# Patient Record
Sex: MALE | Race: WHITE | NOT HISPANIC OR LATINO | Employment: FULL TIME | URBAN - METROPOLITAN AREA
[De-identification: names, ages, dates, MRNs, and addresses within clinical notes are randomized per-mention and may not be internally consistent; named-entity substitution may affect disease eponyms.]

---

## 2017-10-04 ENCOUNTER — ALLSCRIPTS OFFICE VISIT (OUTPATIENT)
Dept: OTHER | Facility: OTHER | Age: 27
End: 2017-10-04

## 2017-10-04 ENCOUNTER — GENERIC CONVERSION - ENCOUNTER (OUTPATIENT)
Dept: OTHER | Facility: OTHER | Age: 27
End: 2017-10-04

## 2017-10-26 ENCOUNTER — OFFICE VISIT (OUTPATIENT)
Dept: URGENT CARE | Facility: MEDICAL CENTER | Age: 27
End: 2017-10-26
Payer: COMMERCIAL

## 2017-10-26 PROCEDURE — G0382 LEV 3 HOSP TYPE B ED VISIT: HCPCS

## 2017-10-28 NOTE — PROGRESS NOTES
Assessment  1  Sore throat (462) (J02 9)    Plan  Sore throat    · Rapid StrepA- POC; Source:Throat; Status:Complete;   Done: 56SHJ6689 08:00PM    negative strep  ears are clear  may be viral  just finished antibiotics which would cover for strep  tylenol for sore throat  continue otc decongestants  if worse or fever f/u here or PCP  Chief Complaint  1  Ear Pain  Chief Complaint Free Text Note Form: PT  Presents with B/L ear pain R greater than L  History of Present Illness  HPI: 31 y/o M c/o BL ear and sore throat for 2 days  He says he just finished 1 days of cefidinr a few days ago  His girlfriend had strep  No fever  No cough some runny nose  taking otc congestion meds  Hospital Based Practices Required Assessment:   Pain Assessment   the patient states they have pain  The pain is located in the ears  (on a scale of 0 to 10, the patient rates the pain at 8 )       Active Problems  1  Asthma, mild intermittent (493 90) (J45 20)   2  Classic migraine with aura (346 00) (G43 109)   3  Left otitis media (382 9) (H66 92)   4  Limb pain (729 5) (M79 609)   5  Male erectile dysfunction (607 84) (N52 9)   6  Orchitis (604 90) (N45 2)   7  Rhinitis (472 0) (J31 0)   8  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)   9  Testicular hypogonadism (257 2) (E29 1)    Past Medical History  1  History of Denial Of Any Significant Medical History   2  History of acne (V13 3) (Z87 2)   3  History of acute sinusitis (V12 69) (Z87 09)   4  History of Intertrigo (695 89) (L30 4)    Family History  Mother    1  Family history of Family Health Status Of Mother - Alive   2  Family history of Hypertension (V17 49)   3  Family history of Osteoporosis (V17 81)  Father    4  Family history of Family Health Status Of Father - Alive   5   Family history of malignant neoplasm of prostate (S51 40) (Z80 45)    Social History   · History of Active smoker (305 1) (Z72 0)   · Being A Social Drinker   · Caffeine Use   · Single    Surgical History  1  History of Arthroscopy Knee    Current Meds   1  Claritin-D 24 Hour  MG Oral Tablet Extended Release 24 Hour; Therapy: (Recorded:04Oct2017) to Recorded   2  Fluticasone Propionate 50 MCG/ACT Nasal Suspension; USE 2 SPRAYS IN EACH   NOSTRIL ONCE DAILY; Therapy: 27GTP2719 to (Last Rx:[id number])  Requested for: 08Apr2016 Ordered   3  ProAir  (90 Base) MCG/ACT Inhalation Aerosol Solution; INHALE 1 TO 2 PUFFS   EVERY 4 TO 6 HOURS AS NEEDED; Therapy: 96HHE2334 to (Last Rx:[id number])  Requested for: 08Apr2016 Ordered    Allergies  1  Aspirin TABS   2  Motrin TABS   3  Penicillins    Vitals  Signs   Recorded: 30XDP3163 06:40PM   Temperature: 98 2 F, Temporal  Heart Rate: 104  Respiration: 18  Systolic: 376  Diastolic: 52  Weight: 204 lb   BMI Calculated: 35 62  BSA Calculated: 2 37  O2 Saturation: 99  Pain Scale: 8    Physical Exam    Constitutional   General appearance: No acute distress, well appearing and well nourished  Eyes   Conjunctiva and lids: No swelling, erythema, or discharge  Pupils and irises: Equal, round and reactive to light  Ears, Nose, Mouth, and Throat   External inspection of ears and nose: Normal     Otoscopic examination: Tympanic membrance translucent with normal light reflex  Canals patent without erythema  Nasal mucosa, septum, and turbinates: Normal without edema or erythema  Oropharynx: Abnormal  -- mild erythema  no exudates or swelling  Pulmonary   Respiratory effort: No increased work of breathing or signs of respiratory distress  Auscultation of lungs: Clear to auscultation  Cardiovascular   Auscultation of heart: Normal rate and rhythm, normal S1 and S2, without murmurs  Abdomen   Abdomen: Non-tender, no masses  Lymphatic   Palpation of lymph nodes in neck: Abnormal  -- R ant cerv LAD        Signatures   Electronically signed by : [facility]; Oct 26 2017  8:00PM EST                       (Author)    Electronically signed by : FIORELLA Veloz ; Oct 27 2017  1:44PM EST                       (Co-author)

## 2018-01-13 NOTE — PROGRESS NOTES
Assessment    1  History of Intertrigo (695 89) (L30 4)   2  History of Active smoker (305 1) (Z72 0)   3  Encounter for preventive health examination (V70 0) (Z00 00)   4  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) (N50 8)    Plan  Asthma, mild intermittent    · ProAir  (90 Base) MCG/ACT Inhalation Aerosol Solution; INHALE 1  TO 2 PUFFS EVERY 4 TO 6 HOURS AS NEEDED  Health Maintenance    · (1) BASIC METABOLIC PROFILE; Status:Active; Requested for:08Apr2016;    · (1) LIPID PANEL, FASTING; Status:Active; Requested for:08Apr2016;   Rhinitis    · Fluticasone Propionate 50 MCG/ACT Nasal Suspension; USE 2 SPRAYS IN  EACH NOSTRIL ONCE DAILY  Testicular hypogonadism    · US SCROTUM AND TESTICLES; Status:Hold For - Scheduling; Requested  ONV:[id number]; Unlinked    · [facility] Allergy CAPS    Discussion/Summary  Impression: health maintenance visit  Currently, he eats an adequate diet and has an adequate exercise regimen  Prostate cancer screening: PSA is not indicated  Testicular cancer screening: the risks and benefits of testicular cancer screening were discussed  Colorectal cancer screening: colorectal cancer screening is not indicated  Screening lab work includes glucose and lipid profile  Advice and education were given regarding nutrition, aerobic exercise, weight bearing exercise and weight loss  History of Present Illness  , Adult Male: The patient is being seen for a health maintenance evaluation  General Health: The patient's health since the last visit is described as good  He has regular dental visits  He denies vision problems  He denies hearing loss  Immunizations status: up to date  Lifestyle:  He consumes a diverse and healthy diet  He does not have any weight concerns  He exercises regularly  He does not use tobacco  He denies alcohol use  He denies drug use  Reproductive health:  the patient is sexually active  birth control is being practiced  He denies erectile dysfunction     Screening: cancer screening reviewed and current  metabolic screening reviewed and current  risk screening reviewed and current  Review of Systems    Constitutional: No fever or chills, feels well, no tiredness, no recent weight gain or weight loss  Eyes: No complaints of eye pain, no red eyes, no discharge from eyes, no itchy eyes  ENT: Has mild TMJ, but no complaints of earache, no hearing loss, no nosebleeds, no nasal discharge, no sore throat, no hoarseness  Cardiovascular: No complaints of slow heart rate, no fast heart rate, no chest pain, no palpitations, no leg claudication, no lower extremity  Respiratory: No complaints of shortness of breath, no wheezing, no cough, no SOB on exertion, no orthopnea or PND  Gastrointestinal: No complaints of abdominal pain, no constipation, no nausea or vomiting, no diarrhea or bloody stools  Genitourinary: No complaints of dysuria, no incontinence, no hesitancy, no nocturia, no genital lesion, no testicular pain  Musculoskeletal: No complaints of arthralgia, no myalgias, no joint swelling or stiffness, no limb pain or swelling  Integumentary: No complaints of skin rash or skin lesions, no itching, no skin wound, no dry skin  Neurological: Has 1-2 ophthalmic migraines a months, but No compliants of headache, no confusion, no convulsions, no numbness or tingling, no dizziness or fainting, no limb weakness, no difficulty walking  Psychiatric: Is not suicidal, no sleep disturbances, no anxiety or depression, no change in personality, no emotional problems  Endocrine: No complaints of proptosis, no hot flashes, no muscle weakness, no erectile dysfunction, no deepening of the voice, no feelings of weakness  Hematologic/Lymphatic: No complaints of swollen glands, no swollen glands in the neck, does not bleed easily, no easy bruising  Active Problems    1  Asthma, mild intermittent (493 90) (J45 20)   2  Classic migraine with aura (346 00) (G43 109)   3  Limb pain (729 5) (M79 609)   4  Male erectile dysfunction (607 84) (N52 9)   5  Orchitis (604 90) (N45 2)   6  Rhinitis (472 0) (J31 0)   7  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) (N50 8)   8  Testicular hypogonadism (257 2) (E29 1)    Past Medical History    · History of Denial Of Any Significant Medical History   · History of acne (V13 3) (Z87 2)   · History of acute sinusitis (V12 69) (Z87 09)   · History of Intertrigo (695 89) (L30 4)    The past medical history was reviewed and updated today  Surgical History    · History of Arthroscopy Knee    The surgical history was reviewed and updated today  Family History    · Family history of Family Health Status Of Mother - Alive   · Family history of Hypertension (V17 49)   · Family history of Osteoporosis (V17 81)    · Family history of Family Health Status Of Father - Alive   · Family history of malignant neoplasm of prostate (V16 42) (Z80 42)    The family history was reviewed and updated today  Social History    · History of Active smoker (305 1) (Z72 0)   · Being A Social Drinker   · Caffeine Use   · Single  The social history was reviewed and updated today  The social history was reviewed and is unchanged  Current Meds   1  ProAir  (90 Base) MCG/ACT Inhalation Aerosol Solution; INHALE 1 TO 2 PUFFS   EVERY 4 TO 6 HOURS AS NEEDED; Therapy: 30BJF3801 to (Last Rx:[id number])  Requested for: 69UDK6518 Ordered   2  [facility] Allergy CAPS; Therapy: ([phone]) to Recorded    The medication list was reviewed and updated today  Allergies    1  Aspirin TABS   2  Motrin TABS   3  Penicillins    Vitals   Recorded: 08Apr2016 08:32AM   Heart Rate 76   Respiration 16   Systolic 686   Diastolic 60   Height 5 ft 11 5 in   Weight 255 lb 6 08 oz   BMI Calculated 35 12   BSA Calculated 2 35     Physical Exam    Constitutional   General appearance: No acute distress, well appearing and well nourished      Eyes   Conjunctiva and lids: No erythema, swelling or discharge  Pupils and irises: Equal, round, reactive to light  Ophthalmoscopic examination: Normal fundi and optic discs  Ears, Nose, Mouth, and Throat   External inspection of ears and nose: Normal     Otoscopic examination: Tympanic membranes translucent with normal light reflex  Canals patent without erythema  Hearing: Normal     Nasal mucosa, septum, and turbinates: Normal without edema or erythema  Lips, teeth, and gums: Normal, good dentition  Oropharynx: Normal with no erythema, edema, exudate or lesions  Neck   Neck: Supple, symmetric, trachea midline, no masses  Thyroid: Normal, no thyromegaly  Pulmonary   Respiratory effort: No increased work of breathing or signs of respiratory distress  Percussion of chest: Normal     Palpation of chest: Normal     Auscultation of lungs: Clear to auscultation  Cardiovascular   Palpation of heart: Normal PMI, no thrills  Auscultation of heart: Normal rate and rhythm, normal S1 and S2, no murmurs  Carotid pulses: 2+ bilaterally  Abdominal aorta: Normal     Femoral pulses: 2+ bilaterally  Pedal pulses: 2+ bilaterally  Examination of extremities for edema and/or varicosities: Normal     Chest   Breasts: Normal, no dimpling or skin changes appreciated  Palpation of breasts and axillae: Normal, no masses palpated  Chest: Normal     Abdomen   Abdomen: Non-tender, no masses  Liver and spleen: No hepatomegaly or splenomegaly  Examination for hernias: No hernias appreciated  Anus, perineum, and rectum: Normal sphincter tone, no masses, no prolapse  Stool sample for occult blood: Negative  Genitourinary   Scrotal contents: Normal testes, no masses  Penis: Normal, no lesions  Lymphatic   Palpation of lymph nodes in neck: No lymphadenopathy  Palpation of lymph nodes in axillae: No lymphadenopathy  Palpation of lymph nodes in groin: No lymphadenopathy      Palpation of lymph nodes in other areas: No lymphadenopathy  Musculoskeletal   Gait and station: Normal     Inspection/palpation of digits and nails: Normal without clubbing or cyanosis  Inspection/palpation of joints, bones, and muscles: Normal     Range of motion: Normal     Stability: Normal     Muscle strength/tone: Normal     Skin   Skin and subcutaneous tissue: Normal without rashes or lesions  Palpation of skin and subcutaneous tissue: Normal turgor  Neurologic   Cranial nerves: Cranial nerves 2-12 intact  Reflexes: 2+ and symmetric  Sensation: No sensory loss  Psychiatric   Judgment and insight: Normal     Orientation to person, place and time: Normal     Recent and remote memory: Intact      Mood and affect: Normal        Signatures   Electronically signed by : LOU Ordonez ; Apr 8 2016  9:15AM EST                       (Author)

## 2018-01-16 NOTE — RESULT NOTES
Verified Results  (1) LIPID PANEL, FASTING 08Apr2016 09:32AM Junito Ibrahim   TW Order Number: [id number]      Triglyceride:         Normal              <150 mg/dl       Borderline High    150-199 mg/dl       High               200-499 mg/dl       Very High          >499 mg/dl  Cholesterol:         Desirable        <200 mg/dl      Borderline High  200-239 mg/dl      High             >239 mg/dl  HDL Cholesterol:        High    >59 mg/dL      Low     <41 mg/dL     Test Name Result Flag Reference   CHOLESTEROL 163 mg/dL     HDL,DIRECT 45 mg/dL  40-60   LDL CHOLESTEROL CALCULATED 92 mg/dL  0-100   TRIGLYCERIDES 132 mg/dL  <=150   Specimen collection should occur prior to N-Acetylcysteine or Metamizole administration due to the potential for falsely depressed results  (1) BASIC METABOLIC PROFILE 06TIH1227 09:32AM Junito Ibrahim    Order Number: [id number]      National Kidney Disease Education Program recommendations are as follows:  GFR calculation is accurate only with a steady state creatinine  Chronic Kidney disease less than 60 ml/min/1 73 sq  meters  Kidney failure less than 15 ml/min/1 73 sq  meters  Test Name Result Flag Reference   GLUCOSE,RANDM 87 mg/dL     If the patient is fasting, the ADA then defines impaired fasting glucose as > 100 mg/dL and diabetes as > or equal to 123 mg/dL     SODIUM 139 mmol/L  136-145   POTASSIUM 4 3 mmol/L  3 5-5 3   CHLORIDE 105 mmol/L  100-108   CARBON DIOXIDE 28 mmol/L  21-32   ANION GAP (CALC) 6 mmol/L  4-13   BLOOD UREA NITROGEN 12 mg/dL  5-25   CREATININE 0 98 mg/dL  0 60-1 30   Standardized to IDMS reference method   CALCIUM 8 9 mg/dL  8 3-10 1   eGFR Non-African American      >60 0 ml/min/1 73sq m

## 2018-01-22 VITALS
SYSTOLIC BLOOD PRESSURE: 146 MMHG | HEART RATE: 120 BPM | WEIGHT: 262.25 LBS | HEIGHT: 72 IN | TEMPERATURE: 101.2 F | DIASTOLIC BLOOD PRESSURE: 60 MMHG | BODY MASS INDEX: 35.52 KG/M2 | RESPIRATION RATE: 18 BRPM

## 2018-01-23 NOTE — MISCELLANEOUS
Message  Return to work or school:   Maru Fischer is under my professional care   He was seen in my office on 10/4/2017   He is able to return to work on  10/5/2017            Signatures   Electronically signed by : LOU Kimble ; Dec 26 2017  4:19PM EST                       (Author)

## 2019-10-13 ENCOUNTER — OFFICE VISIT (OUTPATIENT)
Dept: URGENT CARE | Facility: CLINIC | Age: 29
End: 2019-10-13
Payer: COMMERCIAL

## 2019-10-13 VITALS
WEIGHT: 270 LBS | DIASTOLIC BLOOD PRESSURE: 68 MMHG | RESPIRATION RATE: 18 BRPM | HEART RATE: 84 BPM | HEIGHT: 71 IN | SYSTOLIC BLOOD PRESSURE: 150 MMHG | OXYGEN SATURATION: 98 % | TEMPERATURE: 97 F | BODY MASS INDEX: 37.8 KG/M2

## 2019-10-13 DIAGNOSIS — R19.7 DIARRHEA, UNSPECIFIED TYPE: ICD-10-CM

## 2019-10-13 DIAGNOSIS — J01.90 ACUTE SINUSITIS, RECURRENCE NOT SPECIFIED, UNSPECIFIED LOCATION: Primary | ICD-10-CM

## 2019-10-13 PROCEDURE — G0383 LEV 4 HOSP TYPE B ED VISIT: HCPCS | Performed by: PHYSICIAN ASSISTANT

## 2019-10-13 RX ORDER — FLUTICASONE PROPIONATE 50 MCG
1 SPRAY, SUSPENSION (ML) NASAL DAILY
Qty: 1 BOTTLE | Refills: 0 | Status: SHIPPED | OUTPATIENT
Start: 2019-10-13

## 2019-10-13 RX ORDER — LORATADINE AND PSEUDOEPHEDRINE 10; 240 MG/1; MG/1
TABLET, EXTENDED RELEASE ORAL
COMMUNITY

## 2019-10-13 RX ORDER — AZITHROMYCIN 250 MG/1
TABLET, FILM COATED ORAL
Qty: 6 TABLET | Refills: 0 | Status: SHIPPED | OUTPATIENT
Start: 2019-10-13 | End: 2019-10-17

## 2019-10-13 NOTE — PROGRESS NOTES
[facility]        NAME: Paola Metcalf is a 34 y o  male  : 1990    MRN: [id number]  DATE: 2019  TIME: 2:14 PM    Assessment and Plan   Acute sinusitis, recurrence not specified, unspecified location [J01 90]  1  Acute sinusitis, recurrence not specified, unspecified location  azithromycin (ZITHROMAX) 250 mg tablet    fluticasone (FLONASE) 50 mcg/act nasal spray   2  Diarrhea, unspecified type           Patient Instructions     1  Sinusitis  -take antibiotic as directed  -Flonase nasal spray  -Increase fluids  -Tylenol/motrin  -Salt H20 gargles/throat lozenges  -Try using humidifier at nighttime    2  Diarrhea- benign abdominal exam  -Take OTC immodium as directed  -BRAT diet  -Increase fluids    -Follow-up with PCP within 5 days  -Go to ER with worsening symptoms, difficulty breathing, high fever, or any signs of distress    Chief Complaint     Chief Complaint   Patient presents with    Cold Like Symptoms     sinus pain/congestion x 8 days  reports bloody noses recently   Diarrhea     and abd cramps that started yesterday   Earache     L ear pain/fullness         History of Present Illness       The patient presents today for an evaluation of cold symptoms and sinus pain/pressure for the past 8 days  The patient states that he is having thick mucous from his nose  He also had a bloody nose this morning  The patient also states that he started with diarrhea yesterday as well  Review of Systems   Review of Systems   Constitutional: Negative for chills and fever  HENT: Positive for congestion, sinus pressure and sinus pain  Negative for ear pain and sore throat  Respiratory: Negative for shortness of breath  Cardiovascular: Negative for chest pain  Gastrointestinal: Positive for diarrhea  Negative for abdominal pain  Musculoskeletal: Negative for arthralgias  Skin: Negative for rash  Neurological: Negative for headaches     All other systems reviewed and are negative  Current Medications       Current Outpatient Medications:     loratadine-pseudoephedrine (CLARITIN-D 24 HOUR)  mg per 24 hr tablet, Take by mouth, Disp: , Rfl:     azithromycin (ZITHROMAX) 250 mg tablet, Take 2 tablets today then 1 tablet daily x 4 days, Disp: 6 tablet, Rfl: 0    fluticasone (FLONASE) 50 mcg/act nasal spray, 1 spray into each nostril daily, Disp: 1 Bottle, Rfl: 0    Current Allergies     Allergies as of 10/13/2019 - Reviewed 10/13/2019   Allergen Reaction Noted    Aspirin  11/13/2013    Ibuprofen  11/05/2007    Penicillins  11/05/2007            The following portions of the patient's history were reviewed and updated as appropriate: allergies, current medications, past family history, past medical history, past social history, past surgical history and problem list      History reviewed  No pertinent past medical history  History reviewed  No pertinent surgical history  History reviewed  No pertinent family history  Medications have been verified  Objective   /68 (BP Location: Left arm, Patient Position: Sitting)   Pulse 84   Temp (!) 97 °F (36 1 °C) (Tympanic)   Resp 18   Ht 5' 11" (1 803 m)   Wt 122 kg (270 lb)   SpO2 98%   BMI 37 66 kg/m²        Physical Exam     Physical Exam   Constitutional: He is oriented to person, place, and time  He appears well-developed and well-nourished  No distress  HENT:   Head: Normocephalic and atraumatic  Right Ear: Hearing, tympanic membrane, external ear and ear canal normal    Left Ear: Hearing, tympanic membrane, external ear and ear canal normal    Nose: Mucosal edema present  Right sinus exhibits maxillary sinus tenderness  Mouth/Throat: Uvula is midline, oropharynx is clear and moist and mucous membranes are normal    Eyes: Pupils are equal, round, and reactive to light  Conjunctivae and EOM are normal    Neck: Normal range of motion  Neck supple     Cardiovascular: Normal rate, regular rhythm and normal heart sounds  Pulmonary/Chest: Effort normal and breath sounds normal    Abdominal: Soft  Bowel sounds are normal  There is no tenderness  Lymphadenopathy:     He has no cervical adenopathy  Neurological: He is alert and oriented to person, place, and time  Skin: Skin is warm and dry  Psychiatric: He has a normal mood and affect  Nursing note and vitals reviewed

## 2019-10-13 NOTE — PATIENT INSTRUCTIONS
1  Sinusitis  -take antibiotic as directed  -Flonase nasal spray  -Increase fluids  -Tylenol/motrin  -Salt H20 gargles/throat lozenges  -Try using humidifier at nighttime    2   Diarrhea- benign abdominal exam  -Take OTC immodium as directed  -BRAT diet  -Increase fluids    -Follow-up with PCP within 5 days  -Go to ER with worsening symptoms, difficulty breathing, high fever, or any signs of distress

## 2019-12-08 ENCOUNTER — APPOINTMENT (OUTPATIENT)
Dept: RADIOLOGY | Facility: CLINIC | Age: 29
End: 2019-12-08
Payer: COMMERCIAL

## 2019-12-08 ENCOUNTER — OFFICE VISIT (OUTPATIENT)
Dept: URGENT CARE | Facility: CLINIC | Age: 29
End: 2019-12-08
Payer: COMMERCIAL

## 2019-12-08 VITALS
BODY MASS INDEX: 38.05 KG/M2 | HEART RATE: 119 BPM | OXYGEN SATURATION: 97 % | SYSTOLIC BLOOD PRESSURE: 148 MMHG | RESPIRATION RATE: 18 BRPM | TEMPERATURE: 99.3 F | DIASTOLIC BLOOD PRESSURE: 65 MMHG | WEIGHT: 272.8 LBS

## 2019-12-08 DIAGNOSIS — M79.642 LEFT HAND PAIN: ICD-10-CM

## 2019-12-08 DIAGNOSIS — M79.642 LEFT HAND PAIN: Primary | ICD-10-CM

## 2019-12-08 PROCEDURE — G0382 LEV 3 HOSP TYPE B ED VISIT: HCPCS | Performed by: PHYSICIAN ASSISTANT

## 2019-12-08 PROCEDURE — 73130 X-RAY EXAM OF HAND: CPT

## 2019-12-08 PROCEDURE — 29125 APPL SHORT ARM SPLINT STATIC: CPT | Performed by: PHYSICIAN ASSISTANT

## 2019-12-08 NOTE — PROGRESS NOTES
[facility]        NAME: Jeffrey Saldivar is a 34 y o  male  : 1990    MRN: [id number]  DATE: December 10, 2019  TIME: 8:32 AM    Assessment and Plan   Left hand pain [M79 642]  1  Left hand pain  XR hand 3+ vw left    Cast application         Patient Instructions     Patient Instructions   1  Left hand pain  -Xray as reviewed by myself is negative for fracture- however patient has snuff box tenderness on exam  -Wear splint  -Elevate and apply ice  -Use NSAIDs as directed every 6-8 hours  -Follow-up with Orthopedics within 1 week    Go to ER with worsening symptoms , numbness, weakness, or any signs of distress     Follow up with PCP in 3-5 days  Proceed to  ER if symptoms worsen  Chief Complaint     Chief Complaint   Patient presents with    Hand Injury     c/o of left hand injury  Bette Brito at home  History of Present Illness       Patient is a 61-year-old male who presents today for evaluation of left hand pain  Patient states that last night while at his friend's house, he fell onto the ground and landed onto his right side however he somehow injured his left hand  He states that he did have a few cocktails last night  He rates his pain as a 9/10  Patient is right handed  Review of Systems   Review of Systems   Constitutional: Negative for chills and fever  Respiratory: Negative for shortness of breath  Cardiovascular: Negative for chest pain  Musculoskeletal: Positive for joint swelling  Neurological: Negative for weakness and numbness  All other systems reviewed and are negative          Current Medications       Current Outpatient Medications:     loratadine-pseudoephedrine (CLARITIN-D 24 HOUR)  mg per 24 hr tablet, Take by mouth, Disp: , Rfl:     fluticasone (FLONASE) 50 mcg/act nasal spray, 1 spray into each nostril daily (Patient not taking: Reported on 2019), Disp: 1 Bottle, Rfl: 0    Current Allergies     Allergies as of 2019 - Reviewed 12/08/2019   Allergen Reaction Noted    Aspirin  11/13/2013    Ibuprofen  11/05/2007    Penicillins  11/05/2007            The following portions of the patient's history were reviewed and updated as appropriate: allergies, current medications, past family history, past medical history, past social history, past surgical history and problem list      History reviewed  No pertinent past medical history  History reviewed  No pertinent surgical history  Family History   Problem Relation Age of Onset    No Known Problems Mother     Prostate cancer Father          Medications have been verified  Objective   /65   Pulse (!) 119   Temp 99 3 °F (37 4 °C) (Temporal)   Resp 18   Wt 124 kg (272 lb 12 8 oz)   SpO2 97%   BMI 38 05 kg/m²        Physical Exam     Physical Exam   Constitutional: He is oriented to person, place, and time  He appears well-developed and well-nourished  No distress  Cardiovascular: Normal rate, regular rhythm and normal heart sounds  Pulmonary/Chest: Effort normal and breath sounds normal    Musculoskeletal:        Hands:  Neurological: He is alert and oriented to person, place, and time  Skin: Skin is warm and dry  Psychiatric: He has a normal mood and affect  Nursing note and vitals reviewed  Orthopedic injury treatment  Date/Time: 12/10/2019 8:32 AM  Performed by: Zeeshan Lea PA-C  Authorized by: Zeeshan Lea PA-C     Patient Location:  Clinic  Verbal consent obtained?: Yes    Consent given by:  Patient  Patient identity confirmed:  Verbally with patient  Injury location:  Hand  Location details:  Left hand  Injury type:   Soft tissue  Neurovascular status: Neurovascularly intact    Splint type:  Thumb spica  Cast type:  Short arm  Supplies used:  Cotton padding and Ortho-Glass  Neurovascular status: Neurovascularly intact    Distal perfusion: normal    Neurological function: normal    Range of motion: normal    Patient tolerance:  Patient tolerated the procedure well with no immediate complications

## 2019-12-08 NOTE — PROGRESS NOTES
Cast application  Date/Time: 12/8/2019 3:02 PM  Performed by: Beryle Dublin, RN  Authorized by: Beryle Dublin, RN     Consent:     Consent obtained:  Verbal    Consent given by:  Patient    Risks discussed:  Discoloration, numbness, pain and swelling    Alternatives discussed:  Referral  Pre-procedure details:     Sensation:  Normal    Skin color:  Warm, dry and intact, brisk capillary refill  Procedure details:     Laterality:  Left    Location:  Hand    Hand:  L hand    Strapping: no      Splint type:  Thumb spica    Supplies:  Ortho-Glass, elastic bandage and cotton padding  Post-procedure details:     Pain:  Unchanged    Sensation:  Normal    Skin color:  Warm, dry and intact, brisk capillary refill    Patient tolerance of procedure: Tolerated well, no immediate complications  Comments: Instructed to keep an eye on circulation and adjust ace bandage if necessary

## 2019-12-08 NOTE — PATIENT INSTRUCTIONS
1  Left hand pain  -Xray as reviewed by myself is negative for fracture- however patient has snuff box tenderness on exam  -Wear splint  -Elevate and apply ice  -Use NSAIDs as directed every 6-8 hours  -Follow-up with Orthopedics within 1 week    Go to ER with worsening symptoms , numbness, weakness, or any signs of distress

## 2019-12-16 VITALS
SYSTOLIC BLOOD PRESSURE: 153 MMHG | BODY MASS INDEX: 38.19 KG/M2 | WEIGHT: 272.8 LBS | DIASTOLIC BLOOD PRESSURE: 75 MMHG | HEIGHT: 71 IN | HEART RATE: 96 BPM

## 2019-12-16 DIAGNOSIS — M79.642 HAND PAIN, LEFT: Primary | ICD-10-CM

## 2019-12-16 PROCEDURE — 99203 OFFICE O/P NEW LOW 30 MIN: CPT | Performed by: ORTHOPAEDIC SURGERY

## 2019-12-16 NOTE — PROGRESS NOTES
CHIEF COMPLAINT:  Chief Complaint   Patient presents with    Left Hand - Pain       SUBJECTIVE:  Jose Carnes is a 34y o  year old RHD male who presents left hand pain  Patient states about 1 week ago he fell while at his friend's house  He fell onto the left outstretched hand  He hit some aspect of a railing while falling  He went to urgent care on 12/08/2018 and had x-rays which demonstrated no acute fractures  He was placed into a splint  He states he wore the splint for few days  Today he states that his pain has significantly diminished since the injury 1st occurred  He still notes some discomfort all trying to make a full composite fist   He denies any numbness or tingling  PAST MEDICAL HISTORY:  History reviewed  No pertinent past medical history  PAST SURGICAL HISTORY:  History reviewed  No pertinent surgical history  FAMILY HISTORY:  Family History   Problem Relation Age of Onset    No Known Problems Mother     Prostate cancer Father        SOCIAL HISTORY:  Social History     Tobacco Use    Smoking status: Former Smoker     Types: Cigars    Smokeless tobacco: Never Used    Tobacco comment: cigars currently on occasion  Substance Use Topics    Alcohol use: Yes     Frequency: Monthly or less    Drug use: Never       MEDICATIONS:    Current Outpatient Medications:     fluticasone (FLONASE) 50 mcg/act nasal spray, 1 spray into each nostril daily, Disp: 1 Bottle, Rfl: 0    loratadine-pseudoephedrine (CLARITIN-D 24 HOUR)  mg per 24 hr tablet, Take by mouth, Disp: , Rfl:     ALLERGIES:  Allergies   Allergen Reactions    Aspirin      Asthma attack    Ibuprofen      Asthma attack    Penicillins      No reaction  Parents allergic           REVIEW OF SYSTEMS:  Review of Systems  ROS:   General: no fever, no chills  HEENT:  No loss of hearing or eyesight problems  Eyes:  No red eyes  Respiratory:  No coughing, shortness of breath or wheezing  Cardiovascular:  No chest pain, no palpitations  GI:  Abdomen soft nontender, denies nausea  Endocrine:  No muscle weakness, no frequent urination, no excessive thirst  Urinary:  No dysuria, no incontinence  Musculoskeletal: see HPI and PE  SKIN:  No skin rash, no dry skin  Neurological:  No headaches, no confusion  Psychiatric:  No suicide thoughts, no anxiety, no depression  Review of all other systems is negative    VITALS:  Vitals:    12/16/19 0835   BP: 153/75   Pulse: 96       LABS:  HgA1c: No results found for: HGBA1C  BMP:   Lab Results   Component Value Date    CALCIUM 8 9 04/08/2016    K 4 3 04/08/2016    CO2 28 04/08/2016     04/08/2016    BUN 12 04/08/2016    CREATININE 0 98 04/08/2016       _____________________________________________________  PHYSICAL EXAMINATION:  General: well developed and well nourished, alert, oriented times 3 and appears comfortable  Psychiatric: Normal  HEENT: Trachea Midline, No torticollis  Pulmonary: No audible wheezing or strider  Cardiovascular: No discernable arrhythmia   Skin: No masses, erythema, lacerations, fluctation, ulcerations  Neurovascular: Sensation Intact to the Median, Ulnar, Radial Nerve, Motor Intact to the Median, Ulnar, Radial Nerve and Pulses Intact    MUSCULOSKELETAL EXAMINATION:  Left hand  No erythema edema or ecchymosis noted, skin is warm to touch  Tenderness to palpation over the base of the 1st metacarpal  Patient is able to make a full composite fist but does note some stiffness and discomfort  He has good strength with resisted extension and flexion of the digits  Patient is neurovascularly intact    ___________________________________________________  STUDIES REVIEWED:  Images obtained on 12/08/2019 of the Left hand 3 views demonstrate No acute fractures or dislocations appreciated        PROCEDURES PERFORMED:  Procedures  No Procedures performed today    _____________________________________________________  ASSESSMENT/PLAN:    Left hand contusion  - it was discussed with the patient that he likely contused his hand after his fall  This will require just some time for it to heal  - he can take some Tylenol and anti-inflammatories as needed for pain  - he will follow up with us as needed      Follow Up:  Return if symptoms worsen or fail to improve      Work/school status:  No restrictions    To Do Next Visit:  Re-evaluation of current issue        Scribe Attestation    I,:   Marge Stephens PA-C am acting as a scribe while in the presence of the attending physician :        I,:   Kenny De Oliveira MD personally performed the services described in this documentation    as scribed in my presence :

## 2023-12-21 ENCOUNTER — TELEPHONE (OUTPATIENT)
Age: 33
End: 2023-12-21

## 2023-12-21 ENCOUNTER — OFFICE VISIT (OUTPATIENT)
Dept: GASTROENTEROLOGY | Facility: CLINIC | Age: 33
End: 2023-12-21
Payer: COMMERCIAL

## 2023-12-21 VITALS
OXYGEN SATURATION: 98 % | DIASTOLIC BLOOD PRESSURE: 76 MMHG | HEIGHT: 71 IN | BODY MASS INDEX: 39.7 KG/M2 | WEIGHT: 283.6 LBS | HEART RATE: 77 BPM | SYSTOLIC BLOOD PRESSURE: 118 MMHG

## 2023-12-21 DIAGNOSIS — R19.7 DIARRHEA, UNSPECIFIED TYPE: Primary | ICD-10-CM

## 2023-12-21 DIAGNOSIS — K58.0 IRRITABLE BOWEL SYNDROME WITH DIARRHEA: ICD-10-CM

## 2023-12-21 PROCEDURE — 99204 OFFICE O/P NEW MOD 45 MIN: CPT | Performed by: PHYSICIAN ASSISTANT

## 2023-12-21 RX ORDER — SILDENAFIL 50 MG/1
50 TABLET, FILM COATED ORAL
COMMUNITY
Start: 2023-11-07 | End: 2024-11-06

## 2023-12-21 RX ORDER — CETIRIZINE HYDROCHLORIDE 10 MG/1
10 TABLET, CHEWABLE ORAL DAILY PRN
COMMUNITY

## 2023-12-21 RX ORDER — METOPROLOL SUCCINATE 25 MG/1
25 TABLET, EXTENDED RELEASE ORAL DAILY
COMMUNITY
Start: 2023-07-24

## 2023-12-21 RX ORDER — CITALOPRAM HYDROBROMIDE 10 MG/1
10 TABLET ORAL DAILY
COMMUNITY
Start: 2023-08-29 | End: 2024-08-28

## 2023-12-21 NOTE — TELEPHONE ENCOUNTER
PA SUBMITTED TO PLAN    SURE SCRIPTS QUESTIONS ANSWERED    NOTES ATTACHED    WILL AWAIT PLAN RESPONSE

## 2023-12-21 NOTE — PROGRESS NOTES
[facility]  Alexx Montgomery 33 y.o. male MRN: [id number]       CC: Chronic diarrhea    HPI: Alexx is a 33-year-old male who presents the office today for chronic diarrhea for the last 5 years.  Patient reports that he had severe abdominal pain last summer, and was seen by [facility]. Patient underwent EGD and colonoscopy through [facility] in August 2022.  Although the reports are not available to me, fortunately the pathology is.  Patient had evidence of duodenitis and gastritis.  He had a random colon biopsy that was negative for microscopic colitis and a single polyp removed, which was a tubular adenoma.  In addition he had fecal calprotectin testing that was normal.    Patient reports that he can have more than 5 bowel movements a day, usually worse in the morning.  Patient reports that there are times where he has urgency and is fearful of incontinence when he passes gas.  Patient denies abdominal pain currently.  Patient reports that while he was in college she was on Accutane, and was told that there was an increased risk of Crohn's disease after taking the medicine.    To his knowledge, he is not sure whether or not there is a family history of colon cancer or Crohn's disease.  Patient reports that he has an uncle who has an ostomy.    Review of Systems:    CONSTITUTIONAL: Denies any fever, chills, or rigors. Good appetite, and no recent weight loss.  HEENT: No earache or tinnitus. Denies hearing loss or visual disturbances.  CARDIOVASCULAR: No chest pain or palpitations.   RESPIRATORY: Denies any cough, hemoptysis, shortness of breath or dyspnea on exertion.  GASTROINTESTINAL: As noted in the History of Present Illness.   GENITOURINARY: No problems with urination. Denies any hematuria or dysuria.  NEUROLOGIC: No dizziness or vertigo, denies headaches.   MUSCULOSKELETAL: Denies any muscle or joint pain.   SKIN: Denies skin rashes or itching.   ENDOCRINE: Denies excessive thirst. Denies intolerance to  heat or cold.  PSYCHOSOCIAL: Denies depression or anxiety. Denies any recent memory loss.       Current Outpatient Medications   Medication Sig Dispense Refill    citalopram (CeleXA) 10 mg tablet Take 10 mg by mouth daily      metoprolol succinate (TOPROL-XL) 25 mg 24 hr tablet Take 25 mg by mouth daily      sildenafil (VIAGRA) 50 MG tablet Take 50 mg by mouth      cetirizine (ZyrTEC) 10 MG chewable tablet Chew 10 mg daily as needed      loratadine-pseudoephedrine (CLARITIN-D 24-HOUR)  mg per 24 hr tablet Take by mouth       No current facility-administered medications for this visit.     No past medical history on file.  No past surgical history on file.  Social History     Socioeconomic History    Marital status: /Civil Union     Spouse name: Not on file    Number of children: Not on file    Years of education: Not on file    Highest education level: Not on file   Occupational History    Not on file   Tobacco Use    Smoking status: Former     Types: Cigars    Smokeless tobacco: Never    Tobacco comments:     cigars currently on occasion.   Substance and Sexual Activity    Alcohol use: Yes    Drug use: Never    Sexual activity: Not on file   Other Topics Concern    Not on file   Social History Narrative    Not on file     Social Determinants of Health     Financial Resource Strain: Low Risk  (4/25/2023)    Received from [facility]    Overall Financial Resource Strain (CARDIA)     Difficulty of Paying Living Expenses: Not very hard   Food Insecurity: No Food Insecurity (4/25/2023)    Received from [facility]    Hunger Vital Sign     Worried About Running Out of Food in the Last Year: Never true     Ran Out of Food in the Last Year: Never true   Transportation Needs: No Transportation Needs (4/25/2023)    Received from [facility]    PRAPARE - Transportation     Lack of Transportation (Medical): No     Lack of Transportation (Non-Medical): No    Physical Activity: Insufficiently Active (4/25/2023)    Received from [facility]    Exercise Vital Sign     Days of Exercise per Week: 3 days     Minutes of Exercise per Session: 30 min   Stress: Stress Concern Present (4/25/2023)    Received from [facility]    [place] [place] [employer] - Occupational Stress Questionnaire     Feeling of Stress : Rather much   Social Connections: Unknown (4/25/2023)    Received from [facility]    Social Connection and Isolation Panel [NHANES]     Frequency of Communication with Friends and Family: Twice a week     Frequency of Social Gatherings with Friends and Family: Twice a week     Attends Bahai Services: Patient refused     Active Member of Clubs or Organizations: Patient refused     Attends Club or Organization Meetings: Patient refused     Marital Status:    Intimate Partner Violence: Not At Risk (4/25/2023)    Received from [facility]    Humiliation, Afraid, Rape, and Kick questionnaire     Fear of Current or Ex-Partner: No     Emotionally Abused: No     Physically Abused: No     Sexually Abused: No   Housing Stability: Low Risk  (4/25/2023)    Received from [facility]    Housing Stability Vital Sign     Unable to Pay for Housing in the Last Year: No     Number of Places Lived in the Last Year: 1     Unstable Housing in the Last Year: No     Family History   Problem Relation Age of Onset    No Known Problems Mother     Prostate cancer Father             PHYSICAL EXAM:    There were no vitals filed for this visit.  General Appearance:   Alert and oriented x 3. Cooperative, and in no respiratory distress   HEENT:   Normocephalic, atraumatic, anicteric.     Neck:  Supple, symmetrical, trachea midline   Lungs:   Clear to auscultation bilaterally    Heart::   Regular rate and rhythm   Abdomen:   Soft, non-tender, non-distended; normal bowel sounds; no masses, no  "organomegaly    Genitalia:   Deferred    Rectal:   Deferred    Extremities:  No cyanosis, clubbing or edema    Pulses:  2+ and symmetric all extremities    Skin:  Skin color, texture, turgor normal, no rashes or lesions    Lymph nodes:  No palpable cervical or supraclavicular lymphadenopathy        Lab Results:             Invalid input(s): \"LABALBU\"            Imaging Studies:   No results found.    ASSESSMENT and PLAN:      1) Chronic diarrhea - Had EGD and colonoscopy in August 2022.  Has never had a video capsule endoscopy or small bowel imaging.  Patient reports he was on Accutane in college, and was told that there is an increased risk of inflammatory bowel disease.  There are some articles in the National Institutes of Health that ulcerative colitis was more so linked rather than Crohn's.  There is no evidence of UC on his biopsy results from  GI.  - Check fecal elastase  - Will perform video capsule endoscopy  - Video capsule endoscopy negative, Xifaxan course and colestipol  - Further recommendations based on above findings  - Patient agrees with plan        Follow up after video capsule endoscopy.      Portions of the record may have been created with voice recognition software.  Occasional wrong word or \"sound a like\" substitutions may have occurred due to the inherent limitations of voice recognition software.  Read the chart carefully and recognize, using context, where substitutions have occurred.  "

## 2023-12-28 ENCOUNTER — PROCEDURE VISIT (OUTPATIENT)
Dept: GASTROENTEROLOGY | Facility: CLINIC | Age: 33
End: 2023-12-28
Payer: COMMERCIAL

## 2023-12-28 DIAGNOSIS — D50.9 IRON DEFICIENCY ANEMIA, UNSPECIFIED IRON DEFICIENCY ANEMIA TYPE: Primary | ICD-10-CM

## 2023-12-31 DIAGNOSIS — R19.7 DIARRHEA, UNSPECIFIED TYPE: Primary | ICD-10-CM

## 2023-12-31 PROCEDURE — 91110 GI TRC IMG INTRAL ESOPH-ILE: CPT | Performed by: INTERNAL MEDICINE

## 2023-12-31 RX ORDER — MONTELUKAST SODIUM 4 MG/1
3 TABLET, CHEWABLE ORAL 2 TIMES DAILY
Qty: 180 TABLET | Refills: 6 | Status: SHIPPED | OUTPATIENT
Start: 2023-12-31

## 2023-12-31 NOTE — PROGRESS NOTES
He is a 33-year-old male with chronic diarrhea evaluated for video capsule endoscopy to rule out small bowel Crohn's disease.  He had endoscopy and colonoscopy in August 2022 with [facility] that showed gastritis duodenitis normal colonoscopy with negative biopsies and 1 tubular adenoma removed.    The video capsule endoscopy is normal.  There is no evidence of small bowel Crohn's disease.  There are no bleeding lesions.    Xifaxan has been prescribed and I will add colestipol 3 g p.o. twice daily for treatment of IBS-D.    I just left a message on his machine telling him these results in detail.

## 2024-01-02 ENCOUNTER — TELEPHONE (OUTPATIENT)
Dept: GASTROENTEROLOGY | Facility: CLINIC | Age: 34
End: 2024-01-02

## 2024-01-02 NOTE — TELEPHONE ENCOUNTER
Called and spoke to patient. Gave patient test results. Patient voiced understanding and had no further questions or concerns.

## 2024-01-02 NOTE — TELEPHONE ENCOUNTER
Received patients lab results for Pancreatic elastase. Will scan into patients chart and give to betsy

## 2024-01-08 ENCOUNTER — NURSE TRIAGE (OUTPATIENT)
Age: 34
End: 2024-01-08

## 2024-01-08 NOTE — TELEPHONE ENCOUNTER
"SPOKE WITH PT, ON DAY 3 OF XIFAXAN FOR TREATMENT IBS-D. PT REPORTS MILD STOMACH UPSET, LOOSE STOOLS, FEELS DROWSY, LIGHTHEADED, \"TUNNEL VISION\", ADMITS TO NOT GETTING A FULL 8 HOURS OF SLEEP LAST NIGHT. ADVISED PT XIFAXAN IS SPECIFICALLY USED TO HELP RESET GUT KAREL, SO HE MAY EXPERIENCE BOWEL CHANGES DURING TREATMENT. WOULD LIKE YOUR OPINION ON FEELING DROWSY, LIGHTHEADEDNESS.       Reason for Disposition   Caller has NON-URGENT medicine question about med that PCP or specialist prescribed and triager unable to answer question    Answer Assessment - Initial Assessment Questions  1. NAME of MEDICATION: \"What medicine are you calling about?\"      XIFAXAN  2. QUESTION: \"What is your question?\" (e.g., medication refill, side effect)      WHAT SIDE EFFECTS CAN I EXPECT  4. SYMPTOMS: \"Do you have any symptoms?\"      MILD STOMACH UPSET, LOOSE STOOLS, FEELS DROWSY, LIGHTHEADED, \"TUNNEL VISION\"  5. SEVERITY: If symptoms are present, ask \"Are they mild, moderate or severe?\"      MILD    Protocols used: Medication Question Call-ADULT-[place]    "

## 2024-02-07 RX ORDER — IPRATROPIUM BROMIDE 21 UG/1
SPRAY, METERED NASAL
COMMUNITY

## 2024-02-07 RX ORDER — LISINOPRIL 10 MG/1
1 TABLET ORAL DAILY
COMMUNITY

## 2024-02-07 RX ORDER — RIZATRIPTAN BENZOATE 10 MG/1
TABLET, ORALLY DISINTEGRATING ORAL
COMMUNITY

## 2024-02-07 RX ORDER — DOXYCYCLINE HYCLATE 100 MG
TABLET ORAL
COMMUNITY

## 2024-02-07 RX ORDER — IBUPROFEN 800 MG/1
TABLET ORAL
COMMUNITY

## 2024-02-07 RX ORDER — OMEPRAZOLE 40 MG/1
40 CAPSULE, DELAYED RELEASE ORAL 2 TIMES DAILY
COMMUNITY
Start: 2023-11-14

## 2024-02-07 RX ORDER — CIPROFLOXACIN 500 MG/1
TABLET, FILM COATED ORAL
COMMUNITY

## 2024-02-07 RX ORDER — FLUOXETINE HYDROCHLORIDE 20 MG/1
1 CAPSULE ORAL DAILY
COMMUNITY

## 2024-02-07 RX ORDER — RIFAXIMIN 550 MG/1
TABLET ORAL
COMMUNITY
Start: 2024-01-05

## 2024-02-07 RX ORDER — TAMSULOSIN HYDROCHLORIDE 0.4 MG/1
CAPSULE ORAL
COMMUNITY
Start: 2023-11-07

## 2024-02-08 ENCOUNTER — OFFICE VISIT (OUTPATIENT)
Dept: GASTROENTEROLOGY | Facility: CLINIC | Age: 34
End: 2024-02-08
Payer: COMMERCIAL

## 2024-02-08 VITALS
DIASTOLIC BLOOD PRESSURE: 84 MMHG | SYSTOLIC BLOOD PRESSURE: 120 MMHG | OXYGEN SATURATION: 98 % | WEIGHT: 286.8 LBS | HEART RATE: 87 BPM | BODY MASS INDEX: 40.15 KG/M2 | HEIGHT: 71 IN

## 2024-02-08 DIAGNOSIS — E66.01 CLASS 3 SEVERE OBESITY IN ADULT, UNSPECIFIED BMI, UNSPECIFIED OBESITY TYPE, UNSPECIFIED WHETHER SERIOUS COMORBIDITY PRESENT (HCC): ICD-10-CM

## 2024-02-08 DIAGNOSIS — K58.0 IRRITABLE BOWEL SYNDROME WITH DIARRHEA: Primary | ICD-10-CM

## 2024-02-08 PROCEDURE — 99214 OFFICE O/P EST MOD 30 MIN: CPT | Performed by: PHYSICIAN ASSISTANT

## 2024-02-08 RX ORDER — DIPHENOXYLATE HYDROCHLORIDE AND ATROPINE SULFATE 2.5; .025 MG/1; MG/1
1 TABLET ORAL 2 TIMES DAILY PRN
Qty: 60 TABLET | Refills: 0 | Status: SHIPPED | OUTPATIENT
Start: 2024-02-08

## 2024-02-08 NOTE — PROGRESS NOTES
[facility]  Alexx Montgomery 33 y.o. male MRN: [id number]       CC: Follow-up    HPI: Alexx is a 33-year-old male who presents the office today for follow-up.  He has history of chronic diarrhea.  Patient underwent a pill endoscopy with our office in December given that he has had prior GI workup with  GI.  endoscopy was normal without evidence of small bowel Crohn's disease.  The patient was recommended a Xifaxan course as well as colestipol.  In addition he had a normal fecal elastase.  Patient was recommended colestipol by Dr. Cedeno, which she has been taking for the last 1 to 2 months.  He also took the Xifaxan course that was prescribed.  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    Patient underwent EGD and colonoscopy through  GI in August 2022.  Although the reports are not available to me, fortunately the pathology is.  Patient had evidence of duodenitis and gastritis.  He had a random colon biopsy that was negative for microscopic colitis and a single polyp removed, which was a tubular adenoma.  In addition he had fecal calprotectin testing that was normal.     Review of Systems:    CONSTITUTIONAL: Denies any fever, chills, or rigors. Good appetite, and no recent weight loss.  HEENT: No earache or tinnitus. Denies hearing loss or visual disturbances.  CARDIOVASCULAR: No chest pain or palpitations.   RESPIRATORY: Denies any cough, hemoptysis, shortness of breath or dyspnea on exertion.  GASTROINTESTINAL: As noted in the History of Present Illness.   GENITOURINARY: No problems with urination. Denies any hematuria or dysuria.  NEUROLOGIC: No dizziness or vertigo, denies headaches.   MUSCULOSKELETAL: Denies any muscle or joint pain.   SKIN: Denies skin rashes or itching.   ENDOCRINE: Denies excessive thirst. Denies intolerance to heat or  cold.  PSYCHOSOCIAL: Denies depression or anxiety. Denies any recent memory loss.       Current Outpatient Medications   Medication Sig Dispense Refill    cetirizine (ZyrTEC) 10 MG chewable tablet Chew 10 mg daily as needed      ciprofloxacin (CIPRO) 500 mg tablet       citalopram (CeleXA) 10 mg tablet Take 10 mg by mouth daily      colestipol (COLESTID) 1 g tablet Take 3 tablets (3 g total) by mouth 2 (two) times a day 180 tablet 6    doxycycline hyclate (VIBRA-TABS) 100 mg tablet take 1 tablet by mouth twice a day for 7 days      FLUoxetine (PROzac) 20 mg capsule Take 1 capsule by mouth daily      ibuprofen (MOTRIN) 800 mg tablet       ipratropium (ATROVENT) 0.03 % nasal spray       lisinopril (ZESTRIL) 10 mg tablet Take 1 tablet by mouth daily      metoprolol succinate (TOPROL-XL) 25 mg 24 hr tablet Take 25 mg by mouth daily      nystatin-triamcinolone (MYCOLOG-II) cream       omeprazole (PriLOSEC) 40 MG capsule Take 40 mg by mouth 2 (two) times a day      rizatriptan (MAXALT-MLT) 10 mg disintegrating tablet       sildenafil (VIAGRA) 50 MG tablet Take 50 mg by mouth      tamsulosin (FLOMAX) 0.4 mg take 1 capsule by mouth nightly      Xifaxan 550 MG tablet take 1 tablet by mouth three times a day for 14 days       No current facility-administered medications for this visit.     No past medical history on file.  No past surgical history on file.  Social History     Socioeconomic History    Marital status: /Civil Union     Spouse name: Not on file    Number of children: Not on file    Years of education: Not on file    Highest education level: Not on file   Occupational History    Not on file   Tobacco Use    Smoking status: Former     Types: Cigars    Smokeless tobacco: Never    Tobacco comments:     cigars currently on occasion.   Vaping Use    Vaping status: Never Used   Substance and Sexual Activity    Alcohol use: Yes    Drug use: Never    Sexual activity: Not on file   Other Topics Concern    Not on  file   Social History Narrative    Not on file     Social Determinants of Health     Financial Resource Strain: Low Risk  (4/25/2023)    Received from [facility]    Overall Financial Resource Strain (CARDIA)     Difficulty of Paying Living Expenses: Not very hard   Food Insecurity: No Food Insecurity (4/25/2023)    Received from [facility]    Hunger Vital Sign     Worried About Running Out of Food in the Last Year: Never true     Ran Out of Food in the Last Year: Never true   Transportation Needs: No Transportation Needs (4/25/2023)    Received from [facility]    PRAPARE - Transportation     Lack of Transportation (Medical): No     Lack of Transportation (Non-Medical): No   Physical Activity: Insufficiently Active (4/25/2023)    Received from [facility]    Exercise Vital Sign     Days of Exercise per Week: 3 days     Minutes of Exercise per Session: 30 min   Stress: Stress Concern Present (4/25/2023)    Received from [facility]    [employer] [place] [employer] - Occupational Stress Questionnaire     Feeling of Stress : Rather much   Social Connections: Unknown (4/25/2023)    Received from [facility]    Social Connection and Isolation Panel [NHANES]     Frequency of Communication with Friends and Family: Twice a week     Frequency of Social Gatherings with Friends and Family: Twice a week     Attends Confucianism Services: Patient declined     Active Member of Clubs or Organizations: Patient declined     Attends Club or Organization Meetings: Patient declined     Marital Status:    Intimate Partner Violence: Not At Risk (4/25/2023)    Received from [facility]    Humiliation, Afraid, Rape, and Kick questionnaire     Fear of Current or Ex-Partner: No     Emotionally Abused: No     Physically Abused: No     Sexually Abused: No   Housing Stability: Low Risk  (4/25/2023)    Received  from [facility]    Housing Stability Vital Sign     Unable to Pay for Housing in the Last Year: No     Number of Places Lived in the Last Year: 1     Unstable Housing in the Last Year: No     Family History   Problem Relation Age of Onset    No Known Problems Mother     Prostate cancer Father             PHYSICAL EXAM:    There were no vitals filed for this visit.  General Appearance:   Alert and oriented x 3. Cooperative, and in no respiratory distress   HEENT:   Normocephalic, atraumatic, anicteric.     Neck:  Supple, symmetrical, trachea midline   Lungs:   Clear to auscultation bilaterally    Heart::   Regular rate and rhythm   Abdomen:   Soft, non-tender, non-distended; normal bowel sounds; no masses, no organomegaly    Genitalia:   Deferred    Rectal:   Deferred    Extremities:  No cyanosis, clubbing or edema    Pulses:  2+ and symmetric all extremities    Skin:  Skin color, texture, turgor normal, no rashes or lesions    Lymph nodes:  No palpable cervical or supraclavicular lymphadenopathy        Lab Results:       Results from last 6 Months   Lab Units 01/18/24  1040   POTASSIUM mmol/L 4.4   CHLORIDE mmol/L 104   CO2 mmol/L 25   BUN mg/dL 16   CREATININE mg/dL 0.84   CALCIUM mg/dL 9.6   ALK PHOS U/L 82   ALT U/L 25   AST U/L 26               Imaging Studies:   No results found.    ASSESSMENT and PLAN:      1) IBS-D - Patient had extensive workup including EGD, colonoscopy, video capsule endoscopy, and stool testing.  Patient reports improvement in his symptoms with Xifaxan and colestipol.  However, still has episodes of diarrhea and passes a small amount when he passes flatus.  - Continue colestipol 3 tablets twice daily as established with Dr. Cedeno  - Will prescribe Lomotil for the patient to take when he has a long car ride, performs in a show, etc.  - If he fails above, may need to consider Viberzi versus Lotronex.  We went over that each of these has a black box warning.  - Patient  "requested a referral to nutrition      Follow up in 8 weeks.      Portions of the record may have been created with voice recognition software.  Occasional wrong word or \"sound a like\" substitutions may have occurred due to the inherent limitations of voice recognition software.  Read the chart carefully and recognize, using context, where substitutions have occurred.  "

## 2024-03-19 ENCOUNTER — APPOINTMENT (EMERGENCY)
Dept: RADIOLOGY | Facility: HOSPITAL | Age: 34
End: 2024-03-19
Payer: COMMERCIAL

## 2024-03-19 ENCOUNTER — HOSPITAL ENCOUNTER (EMERGENCY)
Facility: HOSPITAL | Age: 34
Discharge: HOME | End: 2024-03-19
Payer: COMMERCIAL

## 2024-03-19 VITALS
SYSTOLIC BLOOD PRESSURE: 133 MMHG | RESPIRATION RATE: 19 BRPM | OXYGEN SATURATION: 95 % | TEMPERATURE: 98 F | DIASTOLIC BLOOD PRESSURE: 64 MMHG | HEART RATE: 80 BPM

## 2024-03-19 DIAGNOSIS — F41.9 ANXIETY: Primary | ICD-10-CM

## 2024-03-19 LAB
ANION GAP SERPL CALCULATED.3IONS-SCNC: 5 MMOL/L (ref 4–13)
ATRIAL RATE: 79 BPM
BASOPHILS # BLD AUTO: 0.02 THOUSANDS/ÂΜL (ref 0–0.1)
BASOPHILS NFR BLD AUTO: 0 % (ref 0–1)
BUN SERPL-MCNC: 13 MG/DL (ref 5–25)
CALCIUM SERPL-MCNC: 9.8 MG/DL (ref 8.4–10.2)
CARDIAC TROPONIN I PNL SERPL HS: 4 NG/L
CHLORIDE SERPL-SCNC: 103 MMOL/L (ref 96–108)
CO2 SERPL-SCNC: 30 MMOL/L (ref 21–32)
CREAT SERPL-MCNC: 1 MG/DL (ref 0.6–1.3)
EOSINOPHIL # BLD AUTO: 0.07 THOUSAND/ÂΜL (ref 0–0.61)
EOSINOPHIL NFR BLD AUTO: 1 % (ref 0–6)
ERYTHROCYTE [DISTWIDTH] IN BLOOD BY AUTOMATED COUNT: 12.2 % (ref 11.6–15.1)
GFR SERPL CREATININE-BSD FRML MDRD: 98 ML/MIN/1.73SQ M
GLUCOSE SERPL-MCNC: 98 MG/DL (ref 65–140)
HCT VFR BLD AUTO: 46 % (ref 36.5–49.3)
HGB BLD-MCNC: 16.3 G/DL (ref 12–17)
IMM GRANULOCYTES # BLD AUTO: 0.01 THOUSAND/UL (ref 0–0.2)
IMM GRANULOCYTES NFR BLD AUTO: 0 % (ref 0–2)
LYMPHOCYTES # BLD AUTO: 1.4 THOUSANDS/ÂΜL (ref 0.6–4.47)
LYMPHOCYTES NFR BLD AUTO: 27 % (ref 14–44)
MCH RBC QN AUTO: 28.4 PG (ref 26.8–34.3)
MCHC RBC AUTO-ENTMCNC: 35.4 G/DL (ref 31.4–37.4)
MCV RBC AUTO: 80 FL (ref 82–98)
MONOCYTES # BLD AUTO: 0.38 THOUSAND/ÂΜL (ref 0.17–1.22)
MONOCYTES NFR BLD AUTO: 7 % (ref 4–12)
NEUTROPHILS # BLD AUTO: 3.25 THOUSANDS/ÂΜL (ref 1.85–7.62)
NEUTS SEG NFR BLD AUTO: 65 % (ref 43–75)
NRBC BLD AUTO-RTO: 0 /100 WBCS
P AXIS: 58 DEGREES
PLATELET # BLD AUTO: 254 THOUSANDS/UL (ref 149–390)
PMV BLD AUTO: 9.7 FL (ref 8.9–12.7)
POTASSIUM SERPL-SCNC: 4 MMOL/L (ref 3.5–5.3)
PR INTERVAL: 160 MS
QRS AXIS: 9 DEGREES
QRSD INTERVAL: 102 MS
QT INTERVAL: 370 MS
QTC INTERVAL: 424 MS
RBC # BLD AUTO: 5.73 MILLION/UL (ref 3.88–5.62)
SODIUM SERPL-SCNC: 138 MMOL/L (ref 135–147)
T WAVE AXIS: 37 DEGREES
VENTRICULAR RATE: 79 BPM
WBC # BLD AUTO: 5.13 THOUSAND/UL (ref 4.31–10.16)

## 2024-03-19 PROCEDURE — 71045 X-RAY EXAM CHEST 1 VIEW: CPT

## 2024-03-19 PROCEDURE — 93010 ELECTROCARDIOGRAM REPORT: CPT | Performed by: INTERNAL MEDICINE

## 2024-03-19 PROCEDURE — 93005 ELECTROCARDIOGRAM TRACING: CPT

## 2024-03-19 PROCEDURE — 80048 BASIC METABOLIC PNL TOTAL CA: CPT

## 2024-03-19 PROCEDURE — 84484 ASSAY OF TROPONIN QUANT: CPT

## 2024-03-19 PROCEDURE — 85025 COMPLETE CBC W/AUTO DIFF WBC: CPT

## 2024-03-19 PROCEDURE — 99283 EMERGENCY DEPT VISIT LOW MDM: CPT

## 2024-03-19 PROCEDURE — 36415 COLL VENOUS BLD VENIPUNCTURE: CPT

## 2024-03-19 RX ORDER — HYDROXYZINE HYDROCHLORIDE 25 MG/1
12.5 TABLET, FILM COATED ORAL EVERY 6 HOURS PRN
Qty: 12 TABLET | Refills: 0 | Status: SHIPPED | OUTPATIENT
Start: 2024-03-19

## 2024-03-19 RX ORDER — HYDROXYZINE HYDROCHLORIDE 25 MG/1
12.5 TABLET, FILM COATED ORAL ONCE
Status: COMPLETED | OUTPATIENT
Start: 2024-03-19 | End: 2024-03-19

## 2024-03-19 RX ADMIN — HYDROXYZINE HYDROCHLORIDE 12.5 MG: 25 TABLET ORAL at 11:19

## 2024-03-19 NOTE — DISCHARGE INSTRUCTIONS
You were seen in the ED for anxiety. Return to the ED for any worsening symptoms or new symptoms. Follow up with your primary care doctor as soon as possible.  Take atarax as needed for your anxiety and follow up on the resources

## 2024-03-19 NOTE — ED PROVIDER NOTES
"History  Chief Complaint   Patient presents with    Anxiety     Pt presents with c/o \"waking up this morning feeling disassociated\" reports history of anxiety and stopped taking medication about 3m ago. Also reports starting new medication, provigil.      33-year-old male patient presents to the ED complaining of anxiety.  Patient states that he woke up this morning feeling dissociated and also having left-sided chest pain.  Patient states that he also feels like he has chest tightness.  Patient states that he has had intermittent chest pain since December but worse today.  Denies any fevers, vomiting, urinary symptoms but admits to some nausea.  Patient states that he recently just started taking modafinil on Sunday to help him sleep.  Patient is not on any medications for his anxiety currently.  Patient states that he has had panic attacks previously and this feels similar however worse today.        Prior to Admission Medications   Prescriptions Last Dose Informant Patient Reported? Taking?   FLUoxetine (PROzac) 20 mg capsule  Self Yes No   Sig: Take 1 capsule by mouth daily   Xifaxan 550 MG tablet  Self Yes No   Sig: take 1 tablet by mouth three times a day for 14 days   Patient not taking: Reported on 2/8/2024   cetirizine (ZyrTEC) 10 MG chewable tablet  Self Yes No   Sig: Chew 10 mg daily as needed   ciprofloxacin (CIPRO) 500 mg tablet  Self Yes No   Patient not taking: Reported on 2/8/2024   citalopram (CeleXA) 10 mg tablet  Self Yes No   Sig: Take 10 mg by mouth daily   colestipol (COLESTID) 1 g tablet  Self No No   Sig: Take 3 tablets (3 g total) by mouth 2 (two) times a day   diphenoxylate-atropine (LOMOTIL) 2.5-0.025 mg per tablet   No No   Sig: Take 1 tablet by mouth 2 (two) times a day as needed for diarrhea   doxycycline hyclate (VIBRA-TABS) 100 mg tablet  Self Yes No   Sig: take 1 tablet by mouth twice a day for 7 days   Patient not taking: Reported on 2/8/2024   ibuprofen (MOTRIN) 800 mg tablet  Self " Yes No   ipratropium (ATROVENT) 0.03 % nasal spray  Self Yes No   Patient not taking: Reported on 2/8/2024   lisinopril (ZESTRIL) 10 mg tablet  Self Yes No   Sig: Take 1 tablet by mouth daily   Patient not taking: Reported on 2/8/2024   metoprolol succinate (TOPROL-XL) 25 mg 24 hr tablet  Self Yes No   Sig: Take 25 mg by mouth daily   nystatin-triamcinolone (MYCOLOG-II) cream  Self Yes No   Patient not taking: Reported on 2/8/2024   omeprazole (PriLOSEC) 40 MG capsule  Self Yes No   Sig: Take 40 mg by mouth 2 (two) times a day   rizatriptan (MAXALT-MLT) 10 mg disintegrating tablet  Self Yes No   Patient not taking: Reported on 2/8/2024   sildenafil (VIAGRA) 50 MG tablet  Self Yes No   Sig: Take 50 mg by mouth   tamsulosin (FLOMAX) 0.4 mg  Self Yes No   Sig: take 1 capsule by mouth nightly   Patient not taking: Reported on 2/8/2024      Facility-Administered Medications: None       No past medical history on file.    No past surgical history on file.    Family History   Problem Relation Age of Onset    No Known Problems Mother     Prostate cancer Father      I have reviewed and agree with the history as documented.    E-Cigarette/Vaping    E-Cigarette Use Never User      E-Cigarette/Vaping Substances     Social History     Tobacco Use    Smoking status: Former     Types: Cigars    Smokeless tobacco: Never    Tobacco comments:     cigars currently on occasion.   Vaping Use    Vaping status: Never Used   Substance Use Topics    Alcohol use: Yes    Drug use: Never       Review of Systems   Respiratory:  Positive for shortness of breath.    Cardiovascular:  Positive for chest pain.   Psychiatric/Behavioral:  The patient is nervous/anxious.    All other systems reviewed and are negative.      Physical Exam  Physical Exam  Vitals reviewed.   Constitutional:       Appearance: Normal appearance.   HENT:      Head: Normocephalic and atraumatic.      Nose: Nose normal.      Mouth/Throat:      Mouth: Mucous membranes are moist.       Pharynx: Oropharynx is clear.   Eyes:      Extraocular Movements: Extraocular movements intact.      Conjunctiva/sclera: Conjunctivae normal.   Cardiovascular:      Rate and Rhythm: Normal rate and regular rhythm.      Pulses: Normal pulses.      Heart sounds: Normal heart sounds.   Pulmonary:      Effort: Pulmonary effort is normal.      Breath sounds: Normal breath sounds.   Abdominal:      General: Bowel sounds are normal.      Palpations: Abdomen is soft.      Tenderness: There is no abdominal tenderness.   Musculoskeletal:         General: Normal range of motion.      Cervical back: Normal range of motion.   Skin:     General: Skin is warm and dry.   Neurological:      General: No focal deficit present.      Mental Status: He is alert and oriented to person, place, and time. Mental status is at baseline.   Psychiatric:         Mood and Affect: Mood is anxious.         Vital Signs  ED Triage Vitals [03/19/24 1039]   Temperature Pulse Respirations Blood Pressure SpO2   98 °F (36.7 °C) 100 17 (!) 181/86 99 %      Temp Source Heart Rate Source Patient Position - Orthostatic VS BP Location FiO2 (%)   Temporal Monitor Sitting Left arm --      Pain Score       --           Vitals:    03/19/24 1039 03/19/24 1130 03/19/24 1200 03/19/24 1230   BP: (!) 181/86 133/65 128/61 133/64   Pulse: 100 83 78 80   Patient Position - Orthostatic VS: Sitting Sitting           Visual Acuity      ED Medications  Medications   hydrOXYzine HCL (ATARAX) tablet 12.5 mg (12.5 mg Oral Given 3/19/24 1119)       Diagnostic Studies  Results Reviewed       Procedure Component Value Units Date/Time    HS Troponin 0hr (reflex protocol) [[id number]]  (Normal) Collected: 03/19/24 1127    Lab Status: Final result Specimen: Blood from Arm, Right Updated: 03/19/24 1157     hs TnI 0hr 4 ng/L     Basic metabolic panel [[id number]] Collected: 03/19/24 1127    Lab Status: Final result Specimen: Blood from Arm, Right Updated: 03/19/24 1148     Sodium 138  mmol/L      Potassium 4.0 mmol/L      Chloride 103 mmol/L      CO2 30 mmol/L      ANION GAP 5 mmol/L      BUN 13 mg/dL      Creatinine 1.00 mg/dL      Glucose 98 mg/dL      Calcium 9.8 mg/dL      eGFR 98 ml/min/1.73sq m     Narrative:      [employer] guidelines for Chronic Kidney Disease (CKD):     Stage 1 with normal or high GFR (GFR > 90 mL/min/1.73 square meters)    Stage 2 Mild CKD (GFR = 60-89 mL/min/1.73 square meters)    Stage 3A Moderate CKD (GFR = 45-59 mL/min/1.73 square meters)    Stage 3B Moderate CKD (GFR = 30-44 mL/min/1.73 square meters)    Stage 4 Severe CKD (GFR = 15-29 mL/min/1.73 square meters)    Stage 5 End Stage CKD (GFR <15 mL/min/1.73 square meters)  Note: GFR calculation is accurate only with a steady state creatinine    CBC and differential [[id number]]  (Abnormal) Collected: 03/19/24 1127    Lab Status: Final result Specimen: Blood from Arm, Right Updated: 03/19/24 1135     WBC 5.13 Thousand/uL      RBC 5.73 Million/uL      Hemoglobin 16.3 g/dL      Hematocrit 46.0 %      MCV 80 fL      MCH 28.4 pg      MCHC 35.4 g/dL      RDW 12.2 %      MPV 9.7 fL      Platelets 254 Thousands/uL      nRBC 0 /100 WBCs      Neutrophils Relative 65 %      Immature Grans % 0 %      Lymphocytes Relative 27 %      Monocytes Relative 7 %      Eosinophils Relative 1 %      Basophils Relative 0 %      Neutrophils Absolute 3.25 Thousands/µL      Absolute Immature Grans 0.01 Thousand/uL      Absolute Lymphocytes 1.40 Thousands/µL      Absolute Monocytes 0.38 Thousand/µL      Eosinophils Absolute 0.07 Thousand/µL      Basophils Absolute 0.02 Thousands/µL                    XR chest portable   ED Interpretation by Dmitry Kaye MD (03/19 1125)   No acute cardiopulm disease                 Procedures  Procedures         ED Course  ED Course as of 03/19/24 1328   Tue Mar 19, 2024   1128 EKG: normal EKG, normal sinus rhythm,      1217 Will eb evaluated by crisis as patient requesitng. Patient  feeling much improved             HEART Risk Score      Flowsheet Row Most Recent Value   Heart Score Risk Calculator    History 0 Filed at: 03/19/2024 1234   ECG 0 Filed at: 03/19/2024 1234   Age 0 Filed at: 03/19/2024 1234   Risk Factors 1 Filed at: 03/19/2024 1234   Troponin 0 Filed at: 03/19/2024 1234   HEART Score 1 Filed at: 03/19/2024 1234                                        Medical Decision Making  33-year-old male patient presenting with anxiety.  Patient also states that he has some chest pain.  DDx includes anxiety, ACS, MSK pain.  Labs within normal limits, Trope negative.  Chest x-ray negative.  EKG is normal sinus rhythm.  Patient was treated with Atarax with improvement of symptoms.  Discussed with crisis, patient given resources for outpatient follow-up for anxiety.  Patient given prescription for small course of Atarax.  Stable for discharge with follow-up with PCP.  Return precautions given.    Amount and/or Complexity of Data Reviewed  Labs: ordered.  Radiology: ordered and independent interpretation performed.    Risk  Prescription drug management.             Disposition  Final diagnoses:   Anxiety     Time reflects when diagnosis was documented in both MDM as applicable and the Disposition within this note       Time User Action Codes Description Comment    3/19/2024 12:51 PM Dmitry Kaye Add [F41.9] Anxiety           ED Disposition       ED Disposition   Discharge    Condition   Stable    Date/Time   Tue Mar 19, 2024 1251    Comment   Alexx Montgomery discharge to home/self care.                   Follow-up Information    None         Discharge Medication List as of 3/19/2024 12:52 PM        START taking these medications    Details   hydrOXYzine HCL (ATARAX) 25 mg tablet Take 0.5 tablets (12.5 mg total) by mouth every 6 (six) hours as needed for anxiety, Starting Tue 3/19/2024, Normal           CONTINUE these medications which have NOT CHANGED    Details   cetirizine (ZyrTEC) 10 MG chewable  tablet Chew 10 mg daily as needed, Historical Med      ciprofloxacin (CIPRO) 500 mg tablet Historical Med      citalopram (CeleXA) 10 mg tablet Take 10 mg by mouth daily, Starting Tue 8/29/2023, Until Wed 8/28/2024, Historical Med      colestipol (COLESTID) 1 g tablet Take 3 tablets (3 g total) by mouth 2 (two) times a day, Starting Sun 12/31/2023, Normal      diphenoxylate-atropine (LOMOTIL) 2.5-0.025 mg per tablet Take 1 tablet by mouth 2 (two) times a day as needed for diarrhea, Starting u 2/8/2024, Normal      doxycycline hyclate (VIBRA-TABS) 100 mg tablet take 1 tablet by mouth twice a day for 7 days, Historical Med      FLUoxetine (PROzac) 20 mg capsule Take 1 capsule by mouth daily, Historical Med      ibuprofen (MOTRIN) 800 mg tablet Historical Med      ipratropium (ATROVENT) 0.03 % nasal spray Historical Med      lisinopril (ZESTRIL) 10 mg tablet Take 1 tablet by mouth daily, Historical Med      metoprolol succinate (TOPROL-XL) 25 mg 24 hr tablet Take 25 mg by mouth daily, Starting Mon 7/24/2023, Historical Med      nystatin-triamcinolone (MYCOLOG-II) cream Historical Med      omeprazole (PriLOSEC) 40 MG capsule Take 40 mg by mouth 2 (two) times a day, Starting Tue 11/14/2023, Historical Med      rizatriptan (MAXALT-MLT) 10 mg disintegrating tablet Historical Med      sildenafil (VIAGRA) 50 MG tablet Take 50 mg by mouth, Starting Tue 11/7/2023, Until Wed 11/6/2024 at 2359, Historical Med      tamsulosin (FLOMAX) 0.4 mg take 1 capsule by mouth nightly, Historical Med      Xifaxan 550 MG tablet take 1 tablet by mouth three times a day for 14 days, Historical Med             No discharge procedures on file.    PDMP Review       None            ED Provider  Electronically Signed by             Dmitry Kaye MD  03/19/24 2598

## 2024-03-19 NOTE — Clinical Note
Alexx Montgomery was seen and treated in our emergency department on 3/19/2024.                Diagnosis: anxiety    Alexx  .    He may return on this date: 03/21/2024         If you have any questions or concerns, please don't hesitate to call.      Donna Gutiérrez RN    ______________________________           _______________          _______________  Hospital Representative                              Date                                Time

## 2024-05-02 ENCOUNTER — APPOINTMENT (EMERGENCY)
Dept: RADIOLOGY | Facility: HOSPITAL | Age: 34
End: 2024-05-02
Payer: COMMERCIAL

## 2024-05-02 ENCOUNTER — HOSPITAL ENCOUNTER (EMERGENCY)
Facility: HOSPITAL | Age: 34
Discharge: HOME | End: 2024-05-03
Attending: EMERGENCY MEDICINE | Admitting: EMERGENCY MEDICINE
Payer: COMMERCIAL

## 2024-05-02 DIAGNOSIS — R07.9 CHEST PAIN, UNSPECIFIED TYPE: Primary | ICD-10-CM

## 2024-05-02 LAB
ALBUMIN SERPL BCP-MCNC: 4.1 G/DL (ref 3.5–5)
ALP SERPL-CCNC: 83 U/L (ref 34–104)
ALT SERPL W P-5'-P-CCNC: 17 U/L (ref 7–52)
ANION GAP SERPL CALCULATED.3IONS-SCNC: 6 MMOL/L (ref 4–13)
AST SERPL W P-5'-P-CCNC: 17 U/L (ref 13–39)
BASOPHILS # BLD AUTO: 0.03 THOUSANDS/ÂΜL (ref 0–0.1)
BASOPHILS NFR BLD AUTO: 1 % (ref 0–1)
BILIRUB SERPL-MCNC: 0.59 MG/DL (ref 0.2–1)
BUN SERPL-MCNC: 15 MG/DL (ref 5–25)
CALCIUM SERPL-MCNC: 8.7 MG/DL (ref 8.4–10.2)
CARDIAC TROPONIN I PNL SERPL HS: 5 NG/L
CHLORIDE SERPL-SCNC: 106 MMOL/L (ref 96–108)
CO2 SERPL-SCNC: 28 MMOL/L (ref 21–32)
CREAT SERPL-MCNC: 0.97 MG/DL (ref 0.6–1.3)
EOSINOPHIL # BLD AUTO: 0.16 THOUSAND/ÂΜL (ref 0–0.61)
EOSINOPHIL NFR BLD AUTO: 3 % (ref 0–6)
ERYTHROCYTE [DISTWIDTH] IN BLOOD BY AUTOMATED COUNT: 12.6 % (ref 11.6–15.1)
GFR SERPL CREATININE-BSD FRML MDRD: 101 ML/MIN/1.73SQ M
GLUCOSE SERPL-MCNC: 86 MG/DL (ref 65–140)
HCT VFR BLD AUTO: 41.2 % (ref 36.5–49.3)
HGB BLD-MCNC: 14.1 G/DL (ref 12–17)
IMM GRANULOCYTES # BLD AUTO: 0.01 THOUSAND/UL (ref 0–0.2)
IMM GRANULOCYTES NFR BLD AUTO: 0 % (ref 0–2)
LYMPHOCYTES # BLD AUTO: 1.75 THOUSANDS/ÂΜL (ref 0.6–4.47)
LYMPHOCYTES NFR BLD AUTO: 31 % (ref 14–44)
MCH RBC QN AUTO: 28.1 PG (ref 26.8–34.3)
MCHC RBC AUTO-ENTMCNC: 34.2 G/DL (ref 31.4–37.4)
MCV RBC AUTO: 82 FL (ref 82–98)
MONOCYTES # BLD AUTO: 0.54 THOUSAND/ÂΜL (ref 0.17–1.22)
MONOCYTES NFR BLD AUTO: 9 % (ref 4–12)
NEUTROPHILS # BLD AUTO: 3.23 THOUSANDS/ÂΜL (ref 1.85–7.62)
NEUTS SEG NFR BLD AUTO: 56 % (ref 43–75)
NRBC BLD AUTO-RTO: 0 /100 WBCS
PLATELET # BLD AUTO: 213 THOUSANDS/UL (ref 149–390)
PMV BLD AUTO: 10.3 FL (ref 8.9–12.7)
POTASSIUM SERPL-SCNC: 3.7 MMOL/L (ref 3.5–5.3)
PROT SERPL-MCNC: 6.4 G/DL (ref 6.4–8.4)
RBC # BLD AUTO: 5.01 MILLION/UL (ref 3.88–5.62)
SODIUM SERPL-SCNC: 140 MMOL/L (ref 135–147)
WBC # BLD AUTO: 5.72 THOUSAND/UL (ref 4.31–10.16)

## 2024-05-02 PROCEDURE — 36415 COLL VENOUS BLD VENIPUNCTURE: CPT | Performed by: EMERGENCY MEDICINE

## 2024-05-02 PROCEDURE — 84484 ASSAY OF TROPONIN QUANT: CPT | Performed by: EMERGENCY MEDICINE

## 2024-05-02 PROCEDURE — 93005 ELECTROCARDIOGRAM TRACING: CPT

## 2024-05-02 PROCEDURE — 99285 EMERGENCY DEPT VISIT HI MDM: CPT | Performed by: EMERGENCY MEDICINE

## 2024-05-02 PROCEDURE — 80053 COMPREHEN METABOLIC PANEL: CPT | Performed by: EMERGENCY MEDICINE

## 2024-05-02 PROCEDURE — 99285 EMERGENCY DEPT VISIT HI MDM: CPT

## 2024-05-02 PROCEDURE — 85025 COMPLETE CBC W/AUTO DIFF WBC: CPT | Performed by: EMERGENCY MEDICINE

## 2024-05-02 PROCEDURE — 71045 X-RAY EXAM CHEST 1 VIEW: CPT

## 2024-05-03 VITALS
RESPIRATION RATE: 16 BRPM | HEIGHT: 72 IN | DIASTOLIC BLOOD PRESSURE: 63 MMHG | WEIGHT: 265 LBS | SYSTOLIC BLOOD PRESSURE: 134 MMHG | TEMPERATURE: 98 F | HEART RATE: 66 BPM | BODY MASS INDEX: 35.89 KG/M2 | OXYGEN SATURATION: 95 %

## 2024-05-03 LAB
2HR DELTA HS TROPONIN: 0 NG/L
ATRIAL RATE: 91 BPM
CARDIAC TROPONIN I PNL SERPL HS: 5 NG/L
P AXIS: 68 DEGREES
PR INTERVAL: 162 MS
QRS AXIS: 27 DEGREES
QRSD INTERVAL: 98 MS
QT INTERVAL: 354 MS
QTC INTERVAL: 435 MS
T WAVE AXIS: 44 DEGREES
VENTRICULAR RATE: 91 BPM

## 2024-05-03 PROCEDURE — 93010 ELECTROCARDIOGRAM REPORT: CPT | Performed by: INTERNAL MEDICINE

## 2024-05-03 RX ORDER — LIDOCAINE 50 MG/G
1 PATCH TOPICAL ONCE
Status: DISCONTINUED | OUTPATIENT
Start: 2024-05-03 | End: 2024-05-03 | Stop reason: HOSPADM

## 2024-05-03 RX ORDER — ALBUTEROL SULFATE 90 UG/1
2 AEROSOL, METERED RESPIRATORY (INHALATION) ONCE
Status: COMPLETED | OUTPATIENT
Start: 2024-05-03 | End: 2024-05-03

## 2024-05-03 RX ADMIN — LIDOCAINE 1 PATCH: 50 PATCH TOPICAL at 01:15

## 2024-05-03 RX ADMIN — ALBUTEROL SULFATE 2 PUFF: 90 AEROSOL, METERED RESPIRATORY (INHALATION) at 01:57

## 2024-05-03 NOTE — ED PROVIDER NOTES
Pt Name: Alexx Montgomery  MRN: [id number]  Birthdate 1990  Age/Sex: 34 y.o. male  Date of evaluation: 5/2/2024  PCP: Franklin Patrick    CHIEF COMPLAINT    Chief Complaint   Patient presents with    Chest Pain     Known leak to aortic valve, reports  chest pain for over a month, seen at various doctors for same, saw cardiologist today as well but reports today pain is now causing left arm numbness. Not sent in by cardiology, patient is just concerned about pain and new numbness to left arm. Denies shortness of breath.          HPI    34 y.o. male presenting with chest pain.  Patient states he has been having pain on the left side of his chest over the past month, intermittent, constant since last night.  He has been seen at several doctors for, including his cardiologist earlier today.  The pain is currently dull, moderate intensity, in the left upper chest, radiating towards the left arm with some associated tingling, unchanged with movement position exertion or any other factors.  Patient does note a history of a leak in his aortic valve.  He denies shortness of breath, dyspnea on exertion, syncope, or other symptoms.    HPI      Past Medical and Surgical History    History reviewed. No pertinent past medical history.    History reviewed. No pertinent surgical history.    Family History   Problem Relation Age of Onset    No Known Problems Mother     Prostate cancer Father        Social History     Tobacco Use    Smoking status: Former     Types: Cigars    Smokeless tobacco: Never    Tobacco comments:     cigars currently on occasion.   Vaping Use    Vaping status: Never Used   Substance Use Topics    Alcohol use: Yes    Drug use: Never           Allergies    Allergies   Allergen Reactions    Aspirin Shortness Of Breath     Asthma attack    Penicillins      No reaction. Parents allergic.         Home Medications    Prior to Admission medications    Medication Sig Start Date End Date Taking? Authorizing Provider    cetirizine (ZyrTEC) 10 MG chewable tablet Chew 10 mg daily as needed    Historical Provider, MD   ciprofloxacin (CIPRO) 500 mg tablet     Historical Provider, MD   citalopram (CeleXA) 10 mg tablet Take 10 mg by mouth daily 8/29/23 8/28/24  Historical Provider, MD   colestipol (COLESTID) 1 g tablet Take 3 tablets (3 g total) by mouth 2 (two) times a day 12/31/23   Alli Cedeno III, MD   diphenoxylate-atropine (LOMOTIL) 2.5-0.025 mg per tablet Take 1 tablet by mouth 2 (two) times a day as needed for diarrhea 2/8/24   Gema Pope PA-C   doxycycline hyclate (VIBRA-TABS) 100 mg tablet take 1 tablet by mouth twice a day for 7 days  Patient not taking: Reported on 2/8/2024    Historical Provider, MD   FLUoxetine (PROzac) 20 mg capsule Take 1 capsule by mouth daily    Historical Provider, MD   hydrOXYzine HCL (ATARAX) 25 mg tablet Take 0.5 tablets (12.5 mg total) by mouth every 6 (six) hours as needed for anxiety 3/19/24   Min Sanford Kaye MD   ibuprofen (MOTRIN) 800 mg tablet     Historical Provider, MD   ipratropium (ATROVENT) 0.03 % nasal spray     Historical Provider, MD   lisinopril (ZESTRIL) 10 mg tablet Take 1 tablet by mouth daily  Patient not taking: Reported on 2/8/2024    Historical Provider, MD   metoprolol succinate (TOPROL-XL) 25 mg 24 hr tablet Take 25 mg by mouth daily 7/24/23   Historical Provider, MD   nystatin-triamcinolone (MYCOLOG-II) cream     Historical Provider, MD   omeprazole (PriLOSEC) 40 MG capsule Take 40 mg by mouth 2 (two) times a day 11/14/23   Historical Provider, MD   rizatriptan (MAXALT-MLT) 10 mg disintegrating tablet     Historical Provider, MD   sildenafil (VIAGRA) 50 MG tablet Take 50 mg by mouth 11/7/23 11/6/24  Historical Provider, MD   tamsulosin (FLOMAX) 0.4 mg take 1 capsule by mouth nightly  Patient not taking: Reported on 2/8/2024 11/7/23   Historical Provider, MD   Xifaxan 550 MG tablet take 1 tablet by mouth three times a day for 14 days  Patient not taking:  Reported on 2/8/2024 1/5/24   Historical Provider, MD           Review of Systems    Review of Systems   Constitutional:  Negative for appetite change, chills and diaphoresis.   HENT:  Negative for drooling, facial swelling, trouble swallowing and voice change.    Respiratory:  Negative for apnea, shortness of breath and wheezing.    Cardiovascular:  Positive for chest pain. Negative for leg swelling.   Gastrointestinal:  Negative for abdominal distention, abdominal pain, diarrhea, nausea and vomiting.   Genitourinary:  Negative for dysuria and urgency.   Musculoskeletal:  Negative for arthralgias, back pain, gait problem and neck pain.   Skin:  Negative for color change, rash and wound.   Neurological:  Negative for seizures, speech difficulty, weakness and headaches.   Psychiatric/Behavioral:  Negative for agitation, behavioral problems and dysphoric mood. The patient is not nervous/anxious.            All other systems reviewed and negative.    Physical Exam      ED Triage Vitals [05/02/24 2110]   Temperature Pulse Respirations Blood Pressure SpO2   98 °F (36.7 °C) 97 18 156/82 98 %      Temp Source Heart Rate Source Patient Position - Orthostatic VS BP Location FiO2 (%)   Temporal Monitor Sitting Left arm --      Pain Score       --               Physical Exam  Vitals and nursing note reviewed.   Constitutional:       General: He is not in acute distress.     Appearance: He is well-developed. He is not ill-appearing, toxic-appearing or diaphoretic.   HENT:      Head: Normocephalic and atraumatic.      Right Ear: External ear normal.      Left Ear: External ear normal.      Nose: Nose normal. No congestion or rhinorrhea.      Mouth/Throat:      Mouth: Mucous membranes are moist.      Pharynx: Oropharynx is clear. No oropharyngeal exudate or posterior oropharyngeal erythema.   Eyes:      Conjunctiva/sclera: Conjunctivae normal.      Pupils: Pupils are equal, round, and reactive to light.   Neck:      Trachea: No  tracheal deviation.   Cardiovascular:      Rate and Rhythm: Normal rate and regular rhythm.      Pulses: Normal pulses.      Heart sounds: Murmur heard.      Comments: 4 out of 6 holosystolic murmur most clear over the aortic area  Pulmonary:      Effort: Pulmonary effort is normal. No respiratory distress.      Breath sounds: Normal breath sounds. No stridor. No wheezing or rales.   Abdominal:      General: There is no distension.      Palpations: Abdomen is soft.      Tenderness: There is no abdominal tenderness. There is no guarding or rebound.   Musculoskeletal:         General: No deformity. Normal range of motion.      Cervical back: Normal range of motion and neck supple.      Right lower leg: No edema.      Left lower leg: No edema.   Skin:     General: Skin is warm and dry.      Capillary Refill: Capillary refill takes less than 2 seconds.      Findings: No rash.   Neurological:      Mental Status: He is alert and oriented to person, place, and time.   Psychiatric:         Behavior: Behavior normal.         Thought Content: Thought content normal.         Judgment: Judgment normal.              Diagnostic Results  EKG Interpretation    Rate:  91  BPM  Rhythm:  Normal Sinus Rhythm   Axis:  Normal   Intervals: Normal, no blocks, QTc  435 ms  Q waves:  No pathologic Q waves   T waves:  Normal   ST segments:  No significant elevations or depressions     Impression:  Normal sinus rhythm without evidence of acute ischemia or significant arrhythmia      EKG for comparison: EKG dated 19 March 2020 for similar in character no major change    EKG interpreted by me.       Labs:    Results Reviewed       Procedure Component Value Units Date/Time    HS Troponin I 2hr [[id number]]  (Normal) Collected: 05/02/24 2341    Lab Status: Final result Specimen: Blood from Arm, Right Updated: 05/03/24 0119     hs TnI 2hr 5 ng/L      Delta 2hr hsTnI 0 ng/L     HS Troponin I 4hr [[id number]]     Lab Status: No result Specimen: Blood      HS Troponin 0hr (reflex protocol) [[id number]]  (Normal) Collected: 05/02/24 2148    Lab Status: Final result Specimen: Blood from Arm, Right Updated: 05/02/24 2231     hs TnI 0hr 5 ng/L     Comprehensive metabolic panel [[id number]] Collected: 05/02/24 2148    Lab Status: Final result Specimen: Blood from Arm, Right Updated: 05/02/24 2228     Sodium 140 mmol/L      Potassium 3.7 mmol/L      Chloride 106 mmol/L      CO2 28 mmol/L      ANION GAP 6 mmol/L      BUN 15 mg/dL      Creatinine 0.97 mg/dL      Glucose 86 mg/dL      Calcium 8.7 mg/dL      AST 17 U/L      ALT 17 U/L      Alkaline Phosphatase 83 U/L      Total Protein 6.4 g/dL      Albumin 4.1 g/dL      Total Bilirubin 0.59 mg/dL      eGFR 101 ml/min/1.73sq m     Narrative:      National Kid[employer] guidelines for Chronic Kidney Disease (CKD):     Stage 1 with normal or high GFR (GFR > 90 mL/min/1.73 square meters)    Stage 2 Mild CKD (GFR = 60-89 mL/min/1.73 square meters)    Stage 3A Moderate CKD (GFR = 45-59 mL/min/1.73 square meters)    Stage 3B Moderate CKD (GFR = 30-44 mL/min/1.73 square meters)    Stage 4 Severe CKD (GFR = 15-29 mL/min/1.73 square meters)    Stage 5 End Stage CKD (GFR <15 mL/min/1.73 square meters)  Note: GFR calculation is accurate only with a steady state creatinine    CBC and differential [[id number]] Collected: 05/02/24 2148    Lab Status: Final result Specimen: Blood from Arm, Right Updated: 05/02/24 2203     WBC 5.72 Thousand/uL      RBC 5.01 Million/uL      Hemoglobin 14.1 g/dL      Hematocrit 41.2 %      MCV 82 fL      MCH 28.1 pg      MCHC 34.2 g/dL      RDW 12.6 %      MPV 10.3 fL      Platelets 213 Thousands/uL      nRBC 0 /100 WBCs      Segmented % 56 %      Immature Grans % 0 %      Lymphocytes % 31 %      Monocytes % 9 %      Eosinophils Relative 3 %      Basophils Relative 1 %      Absolute Neutrophils 3.23 Thousands/µL      Absolute Immature Grans 0.01 Thousand/uL      Absolute Lymphocytes 1.75 Thousands/µL       Absolute Monocytes 0.54 Thousand/µL      Eosinophils Absolute 0.16 Thousand/µL      Basophils Absolute 0.03 Thousands/µL             All labs reviewed and utilized in the medical decision making process    Radiology:    XR chest 1 view portable   ED Interpretation   No acute cardiopulmonary process or osseous abnormality.                  All radiology studies independently viewed by me and interpreted by the radiologist.    Procedure    Procedures    HEART score:    History 0=Slightly or non-suspicious   ECG 0=Normal   Age 0= < 45 years   Risk Factors 1= 1 or 2 risk factors   Troponin 0= Less than or equal to 12 ng/L   Total 1            ED Course of Care and Re-Assessments      Given lidocaine patch as well as albuterol for symptomatic treatment.    Medications   lidocaine (LIDODERM) 5 % patch 1 patch (1 patch Topical Medication Applied 5/3/24 0115)   albuterol (PROVENTIL HFA,VENTOLIN HFA) inhaler 2 puff (2 puffs Inhalation Given 5/3/24 0157)           FINAL IMPRESSION    Final diagnoses:   Chest pain, unspecified type         DISPOSITION/PLAN    Presentation as above with chest pain.  Vital signs reassuring, examination likewise reassuring.  History highly atypical for ACS, EKG reassuring and several negative troponins, low suspicion for same.  Do not suspect PE, aortic dissection, bacterial pneumonia, pneumothorax, necrotizing soft tissue infection, critical cord or nerve root compression, other acute life-threatening cause of chest pain.  Some suspicion for musculoskeletal chest pain although some bronchospasm from seasonal allergies after mowing the lawn is also considered.  Echocardiogram performed on 4/29 reviewed in the electronic medical record, notable for aortic regurgitation although preserved ejection fraction of 60 to 65% and no pericardial effusion.  Discharged with strict return precautions, follow-up primary care doctor as well as cardiologist.  Time reflects when diagnosis was documented in  both MDM as applicable and the Disposition within this note       Time User Action Codes Description Comment    5/3/2024  1:35 AM Balta Rojas Add [R07.9] Chest pain, unspecified type           ED Disposition       ED Disposition   Discharge    Condition   Stable    Date/Time   Fri May 3, 2024  1:35 AM    Comment   Alexx LOU Baezconrado discharge to home/self care.                   Follow-up Information       Follow up With Specialties Details Why Contact Info Additional Information    [facility] Emergency Department Emergency Medicine Go to  If symptoms worsen [address] [facility] [address]  [phone] [facility] Emergency Department, [address] [place], [place], [address]    [facility] Call in 1 day To discuss this visit and schedule close follow-up [address] [facility] [address]  [facility] [id number] [phone]       Raul Ren MD Cardiology Call in 1 day To discuss this visit and schedule close follow-up, To discuss further cardiac risk stratification [address] [place] [address]  [phone]                 PATIENT REFERRED TO:    [facility] Emergency Department  [address] [facility] [id number] [phone]  Go to   If symptoms worsen    Franklin Patrick  [address] [facility]  [address]  [facility] [id number] [phone]    Call in 1 day  To discuss this visit and schedule close follow-up    Raul Ren MD  [address] [place] [address]  [phone]    Call in 1 day  To discuss this visit and schedule close follow-up, To discuss further cardiac risk stratification      DISCHARGE MEDICATIONS:    Patient's Medications   Discharge Prescriptions    No medications on file       No discharge procedures on file.         Balta Rojas MD    Portions of the record may have been created with voice recognition  "software.  Occasional wrong word or \"sound alike\" substitutions may have occurred due to the inherent limitations of voice recognition software.  Please read the chart carefully and recognize, using context, where substitutions have occurred     Balta Rojas MD  05/03/24 0206    "

## 2024-05-07 RX ORDER — BUSPIRONE HYDROCHLORIDE 5 MG/1
5 TABLET ORAL 3 TIMES DAILY
COMMUNITY
Start: 2024-03-26 | End: 2025-03-26

## 2024-05-07 RX ORDER — MODAFINIL 100 MG/1
100 TABLET ORAL 2 TIMES DAILY
COMMUNITY
Start: 2024-04-15 | End: 2024-05-16

## 2024-05-09 ENCOUNTER — OFFICE VISIT (OUTPATIENT)
Dept: GASTROENTEROLOGY | Facility: CLINIC | Age: 34
End: 2024-05-09
Payer: COMMERCIAL

## 2024-05-09 VITALS
SYSTOLIC BLOOD PRESSURE: 140 MMHG | BODY MASS INDEX: 34.95 KG/M2 | HEIGHT: 72 IN | RESPIRATION RATE: 18 BRPM | WEIGHT: 258 LBS | HEART RATE: 74 BPM | TEMPERATURE: 98 F | DIASTOLIC BLOOD PRESSURE: 86 MMHG | OXYGEN SATURATION: 98 %

## 2024-05-09 DIAGNOSIS — K58.0 IRRITABLE BOWEL SYNDROME WITH DIARRHEA: Primary | ICD-10-CM

## 2024-05-09 PROCEDURE — 99213 OFFICE O/P EST LOW 20 MIN: CPT | Performed by: PHYSICIAN ASSISTANT

## 2024-05-09 RX ORDER — NORTRIPTYLINE HYDROCHLORIDE 10 MG/1
10 CAPSULE ORAL
Qty: 90 CAPSULE | Refills: 0 | Status: SHIPPED | OUTPATIENT
Start: 2024-05-09

## 2024-05-09 RX ORDER — DIPHENOXYLATE HYDROCHLORIDE AND ATROPINE SULFATE 2.5; .025 MG/1; MG/1
1 TABLET ORAL 2 TIMES DAILY PRN
Qty: 60 TABLET | Refills: 0 | Status: SHIPPED | OUTPATIENT
Start: 2024-05-09

## 2024-05-09 NOTE — PROGRESS NOTES
[facility]  Alexx Montgomery 34 y.o. male MRN: [id number]       CC: Follow-up for irritable bowel syndrome, diarrhea predominant    HPI: Alexx is a 33-year-old male who presents the office today for follow-up for irritable bowel syndrome and diarrhea.  When I last saw the patient, I recommended adding Lomotil.  Patient reports he has been using it once a day from Monday to Friday at work.  Patient reports that he feels that this has been a positive addition to his regimen.  He has been taking 6 colestipol daily as recommended by Dr. Cedeno.  Patient reports that he has a lot of anxiety related to needing to use the bathroom, and being unsure whether or not he needs to pass gas or a small bowel movement.  Patient is also going to see his PCP this afternoon for numbness and tingling sensation that has been experiencing.  He has history of aortic valve regurgitation.    Patient underwent EGD and colonoscopy through [facility] in August 2022.  Although the reports are not available to me, fortunately the pathology is.  Patient had evidence of duodenitis and gastritis.  He had a random colon biopsy that was negative for microscopic colitis and a single polyp removed, which was a tubular adenoma.  In addition he had fecal calprotectin testing that was normal.      Review of Systems:    CONSTITUTIONAL: Denies any fever, chills, or rigors. Good appetite, and no recent weight loss.  HEENT: No earache or tinnitus. Denies hearing loss or visual disturbances.  CARDIOVASCULAR: No chest pain or palpitations.   RESPIRATORY: Denies any cough, hemoptysis, shortness of breath or dyspnea on exertion.  GASTROINTESTINAL: As noted in the History of Present Illness.   GENITOURINARY: No problems with urination. Denies any hematuria or dysuria.  NEUROLOGIC: No dizziness or vertigo, denies headaches.   MUSCULOSKELETAL: Denies any muscle or joint pain.   SKIN: Denies skin rashes or itching.   ENDOCRINE: Denies excessive thirst.  Denies intolerance to heat or cold.  PSYCHOSOCIAL: Denies depression or anxiety. Denies any recent memory loss.       Current Outpatient Medications   Medication Sig Dispense Refill    busPIRone (BUSPAR) 5 mg tablet Take 5 mg by mouth Three times a day      cetirizine (ZyrTEC) 10 MG chewable tablet Chew 10 mg daily as needed      colestipol (COLESTID) 1 g tablet Take 3 tablets (3 g total) by mouth 2 (two) times a day 180 tablet 6    diphenoxylate-atropine (LOMOTIL) 2.5-0.025 mg per tablet Take 1 tablet by mouth 2 (two) times a day as needed for diarrhea 60 tablet 0    FLUoxetine (PROzac) 20 mg capsule Take 1 capsule by mouth daily      metoprolol succinate (TOPROL-XL) 25 mg 24 hr tablet Take 25 mg by mouth daily      modafinil (PROVIGIL) 100 mg tablet Take 100 mg by mouth 2 (two) times a day      omeprazole (PriLOSEC) 40 MG capsule Take 40 mg by mouth 2 (two) times a day      sildenafil (VIAGRA) 50 MG tablet Take 50 mg by mouth      ciprofloxacin (CIPRO) 500 mg tablet  (Patient not taking: Reported on 2/8/2024)      citalopram (CeleXA) 10 mg tablet Take 10 mg by mouth daily      doxycycline hyclate (VIBRA-TABS) 100 mg tablet take 1 tablet by mouth twice a day for 7 days (Patient not taking: Reported on 2/8/2024)      hydrOXYzine HCL (ATARAX) 25 mg tablet Take 0.5 tablets (12.5 mg total) by mouth every 6 (six) hours as needed for anxiety 12 tablet 0    ibuprofen (MOTRIN) 800 mg tablet       ipratropium (ATROVENT) 0.03 % nasal spray  (Patient not taking: Reported on 2/8/2024)      lisinopril (ZESTRIL) 10 mg tablet Take 1 tablet by mouth daily (Patient not taking: Reported on 2/8/2024)      nystatin-triamcinolone (MYCOLOG-II) cream  (Patient not taking: Reported on 2/8/2024)      rizatriptan (MAXALT-MLT) 10 mg disintegrating tablet  (Patient not taking: Reported on 2/8/2024)      tamsulosin (FLOMAX) 0.4 mg take 1 capsule by mouth nightly (Patient not taking: Reported on 2/8/2024)      Xifaxan 550 MG tablet take 1  tablet by mouth three times a day for 14 days (Patient not taking: Reported on 2/8/2024)       No current facility-administered medications for this visit.     History reviewed. No pertinent past medical history.  History reviewed. No pertinent surgical history.  Social History     Socioeconomic History    Marital status: /Civil Union     Spouse name: None    Number of children: None    Years of education: None    Highest education level: None   Occupational History    None   Tobacco Use    Smoking status: Former     Types: Cigars    Smokeless tobacco: Never    Tobacco comments:     cigars currently on occasion.   Vaping Use    Vaping status: Never Used   Substance and Sexual Activity    Alcohol use: Yes    Drug use: Never    Sexual activity: None   Other Topics Concern    None   Social History Narrative    None     Social Determinants of Health     Financial Resource Strain: Low Risk  (4/25/2023)    Received from [facility]    Overall Financial Resource Strain (CARDIA)     Difficulty of Paying Living Expenses: Not very hard   Food Insecurity: No Food Insecurity (4/25/2023)    Received from [facility]    Hunger Vital Sign     Worried About Running Out of Food in the Last Year: Never true     Ran Out of Food in the Last Year: Never true   Transportation Needs: No Transportation Needs (4/25/2023)    Received from [facility]    PRAPARE - Transportation     Lack of Transportation (Medical): No     Lack of Transportation (Non-Medical): No   Physical Activity: Insufficiently Active (4/25/2023)    Received from [facility]    Exercise Vital Sign     Days of Exercise per Week: 3 days     Minutes of Exercise per Session: 30 min   Stress: Stress Concern Present (4/25/2023)    Received from [facility]    [employer] [place] [employer] - Occupational Stress Questionnaire     Feeling of Stress : Rather much   Social  Connections: Unknown (4/25/2023)    Received from [facility]    Social Connection and Isolation Panel [NHANES]     Frequency of Communication with Friends and Family: Twice a week     Frequency of Social Gatherings with Friends and Family: Twice a week     Attends Buddhist Services: Patient declined     Active Member of Clubs or Organizations: Patient declined     Attends Club or Organization Meetings: Patient declined     Marital Status:    Intimate Partner Violence: Not At Risk (4/25/2023)    Received from [facility]    Humiliation, Afraid, Rape, and Kick questionnaire     Fear of Current or Ex-Partner: No     Emotionally Abused: No     Physically Abused: No     Sexually Abused: No   Housing Stability: Low Risk  (4/25/2023)    Received from [facility]    Housing Stability Vital Sign     Unable to Pay for Housing in the Last Year: No     Number of Places Lived in the Last Year: 1     Unstable Housing in the Last Year: No     Family History   Problem Relation Age of Onset    No Known Problems Mother     Prostate cancer Father             PHYSICAL EXAM:    There were no vitals filed for this visit.  General Appearance:   Alert and oriented x 3. Cooperative, and in no respiratory distress   HEENT:   Normocephalic, atraumatic, anicteric.     Neck:  Supple, symmetrical, trachea midline   Lungs:   Clear to auscultation bilaterally    Heart::   Regular rate and rhythm   Abdomen:   Soft, non-tender, non-distended; normal bowel sounds; no masses, no organomegaly    Genitalia:   Deferred    Rectal:   Deferred    Extremities:  No cyanosis, clubbing or edema    Pulses:  2+ and symmetric all extremities    Skin:  Skin color, texture, turgor normal, no rashes or lesions    Lymph nodes:  No palpable cervical or supraclavicular lymphadenopathy        Lab Results:   Results from last 6 Months   Lab Units 05/02/24  2148   WBC Thousand/uL 5.72   HEMOGLOBIN g/dL 14.1  "  HEMATOCRIT % 41.2   PLATELETS Thousands/uL 213   SEGS PCT % 56   LYMPHO PCT % 31   MONO PCT % 9   EOS PCT % 3     Results from last 6 Months   Lab Units 05/07/24  0908   POTASSIUM mmol/L 4.1   CHLORIDE mmol/L 104   CO2 mmol/L 28   BUN mg/dL 16   CREATININE mg/dL 0.94   CALCIUM mg/dL 9.7   ALK PHOS U/L 97   ALT U/L 18   AST U/L 15               Imaging Studies:   XR chest pa & lateral    Result Date: 5/7/2024  Impression: Impression: No acute pathology visualized. Workstation:[id number]    XR chest 1 view portable    Result Date: 5/3/2024  Impression: No acute cardiopulmonary disease. Findings are stable Workstation performed: [id number]       ASSESSMENT and PLAN:      1) IBS-D, anxiety - We discussed the gut brain relationship.  Patient reports that his symptoms are overall better since he started coming to our office, feels that Lomotil has been helpful at on treatment.  However, still feels the need to go to the bathroom \"just in case\" he needs to have a bowel movement.  We discussed the role of TCA with regard to irritable bowel syndrome.  He is open to adding this to his regimen and stopping BuSpar.  He confirmed that he is no longer on any other medications like hydroxyzine, Celexa and Prozac.  - Our office will contact his cardiologist at [facility] to ensure it is okay to prescribe Pamelor.  I reassured the patient that we would start low and increase as tolerated.  Went over common side effects, prolonged QT, etc.  - He will continue colestipol total of 6 tablets daily  - Patient reports that Lomotil has been very helpful to him, and has been using it once a day Monday to Friday in order for him to get through work successfully  - In the future we can consider Viberzi, however went over several warnings associate with medication.  Patient would like to stay with his current regimen for now.  - He is going to be following up with his PCP this afternoon regarding numbness and tingling sensation throughout " "his body, recommend tickborne illness testing        Follow up in 8-12 weeks.      Portions of the record may have been created with voice recognition software.  Occasional wrong word or \"sound a like\" substitutions may have occurred due to the inherent limitations of voice recognition software.  Read the chart carefully and recognize, using context, where substitutions have occurred.  "

## 2024-05-10 ENCOUNTER — APPOINTMENT (EMERGENCY)
Dept: CT IMAGING | Facility: HOSPITAL | Age: 34
End: 2024-05-10
Payer: COMMERCIAL

## 2024-05-10 ENCOUNTER — HOSPITAL ENCOUNTER (EMERGENCY)
Facility: HOSPITAL | Age: 34
Discharge: HOME | End: 2024-05-10
Attending: EMERGENCY MEDICINE
Payer: COMMERCIAL

## 2024-05-10 VITALS
SYSTOLIC BLOOD PRESSURE: 127 MMHG | RESPIRATION RATE: 15 BRPM | HEART RATE: 67 BPM | TEMPERATURE: 98.1 F | OXYGEN SATURATION: 97 % | DIASTOLIC BLOOD PRESSURE: 59 MMHG

## 2024-05-10 DIAGNOSIS — E83.42 HYPOMAGNESEMIA: ICD-10-CM

## 2024-05-10 DIAGNOSIS — E87.6 HYPOKALEMIA: ICD-10-CM

## 2024-05-10 DIAGNOSIS — R20.2 PARESTHESIAS: Primary | ICD-10-CM

## 2024-05-10 DIAGNOSIS — R51.9 LEFT-SIDED HEADACHE: ICD-10-CM

## 2024-05-10 LAB
ANION GAP SERPL CALCULATED.3IONS-SCNC: 4 MMOL/L (ref 4–13)
ATRIAL RATE: 76 BPM
BASOPHILS # BLD AUTO: 0.03 THOUSANDS/ÂΜL (ref 0–0.1)
BASOPHILS NFR BLD AUTO: 0 % (ref 0–1)
BUN SERPL-MCNC: 15 MG/DL (ref 5–25)
CALCIUM SERPL-MCNC: 7.2 MG/DL (ref 8.4–10.2)
CHLORIDE SERPL-SCNC: 111 MMOL/L (ref 96–108)
CO2 SERPL-SCNC: 24 MMOL/L (ref 21–32)
CREAT SERPL-MCNC: 0.68 MG/DL (ref 0.6–1.3)
EOSINOPHIL # BLD AUTO: 0.08 THOUSAND/ÂΜL (ref 0–0.61)
EOSINOPHIL NFR BLD AUTO: 1 % (ref 0–6)
ERYTHROCYTE [DISTWIDTH] IN BLOOD BY AUTOMATED COUNT: 12.5 % (ref 11.6–15.1)
GFR SERPL CREATININE-BSD FRML MDRD: 124 ML/MIN/1.73SQ M
GLUCOSE SERPL-MCNC: 77 MG/DL (ref 65–140)
HCT VFR BLD AUTO: 45.4 % (ref 36.5–49.3)
HGB BLD-MCNC: 15.8 G/DL (ref 12–17)
IMM GRANULOCYTES # BLD AUTO: 0.02 THOUSAND/UL (ref 0–0.2)
IMM GRANULOCYTES NFR BLD AUTO: 0 % (ref 0–2)
LYMPHOCYTES # BLD AUTO: 1.43 THOUSANDS/ÂΜL (ref 0.6–4.47)
LYMPHOCYTES NFR BLD AUTO: 19 % (ref 14–44)
MAGNESIUM SERPL-MCNC: 1.6 MG/DL (ref 1.9–2.7)
MCH RBC QN AUTO: 28.2 PG (ref 26.8–34.3)
MCHC RBC AUTO-ENTMCNC: 34.8 G/DL (ref 31.4–37.4)
MCV RBC AUTO: 81 FL (ref 82–98)
MONOCYTES # BLD AUTO: 0.58 THOUSAND/ÂΜL (ref 0.17–1.22)
MONOCYTES NFR BLD AUTO: 8 % (ref 4–12)
NEUTROPHILS # BLD AUTO: 5.37 THOUSANDS/ÂΜL (ref 1.85–7.62)
NEUTS SEG NFR BLD AUTO: 72 % (ref 43–75)
NRBC BLD AUTO-RTO: 0 /100 WBCS
P AXIS: 63 DEGREES
PLATELET # BLD AUTO: 246 THOUSANDS/UL (ref 149–390)
PMV BLD AUTO: 10.3 FL (ref 8.9–12.7)
POTASSIUM SERPL-SCNC: 3.3 MMOL/L (ref 3.5–5.3)
PR INTERVAL: 162 MS
QRS AXIS: 7 DEGREES
QRSD INTERVAL: 100 MS
QT INTERVAL: 380 MS
QTC INTERVAL: 427 MS
RBC # BLD AUTO: 5.6 MILLION/UL (ref 3.88–5.62)
SODIUM SERPL-SCNC: 139 MMOL/L (ref 135–147)
T WAVE AXIS: 33 DEGREES
VENTRICULAR RATE: 76 BPM
WBC # BLD AUTO: 7.51 THOUSAND/UL (ref 4.31–10.16)

## 2024-05-10 PROCEDURE — 93010 ELECTROCARDIOGRAM REPORT: CPT | Performed by: INTERNAL MEDICINE

## 2024-05-10 PROCEDURE — 85025 COMPLETE CBC W/AUTO DIFF WBC: CPT | Performed by: EMERGENCY MEDICINE

## 2024-05-10 PROCEDURE — 80048 BASIC METABOLIC PNL TOTAL CA: CPT | Performed by: EMERGENCY MEDICINE

## 2024-05-10 PROCEDURE — 70498 CT ANGIOGRAPHY NECK: CPT

## 2024-05-10 PROCEDURE — 93005 ELECTROCARDIOGRAM TRACING: CPT

## 2024-05-10 PROCEDURE — 70496 CT ANGIOGRAPHY HEAD: CPT

## 2024-05-10 PROCEDURE — 36415 COLL VENOUS BLD VENIPUNCTURE: CPT | Performed by: EMERGENCY MEDICINE

## 2024-05-10 PROCEDURE — 99285 EMERGENCY DEPT VISIT HI MDM: CPT

## 2024-05-10 PROCEDURE — 99285 EMERGENCY DEPT VISIT HI MDM: CPT | Performed by: EMERGENCY MEDICINE

## 2024-05-10 PROCEDURE — 83735 ASSAY OF MAGNESIUM: CPT | Performed by: EMERGENCY MEDICINE

## 2024-05-10 PROCEDURE — 96375 TX/PRO/DX INJ NEW DRUG ADDON: CPT

## 2024-05-10 PROCEDURE — 96365 THER/PROPH/DIAG IV INF INIT: CPT

## 2024-05-10 RX ORDER — LANOLIN ALCOHOL/MO/W.PET/CERES
800 CREAM (GRAM) TOPICAL ONCE
Status: COMPLETED | OUTPATIENT
Start: 2024-05-10 | End: 2024-05-10

## 2024-05-10 RX ORDER — POTASSIUM CHLORIDE 20 MEQ/1
20 TABLET, EXTENDED RELEASE ORAL 2 TIMES DAILY
Qty: 30 TABLET | Refills: 0 | Status: SHIPPED | OUTPATIENT
Start: 2024-05-10

## 2024-05-10 RX ORDER — DIPHENHYDRAMINE HYDROCHLORIDE 50 MG/ML
25 INJECTION INTRAMUSCULAR; INTRAVENOUS ONCE
Status: COMPLETED | OUTPATIENT
Start: 2024-05-10 | End: 2024-05-10

## 2024-05-10 RX ORDER — METOCLOPRAMIDE HYDROCHLORIDE 5 MG/ML
10 INJECTION INTRAMUSCULAR; INTRAVENOUS ONCE
Status: COMPLETED | OUTPATIENT
Start: 2024-05-10 | End: 2024-05-10

## 2024-05-10 RX ORDER — MAGNESIUM OXIDE 400 MG/1
400 TABLET ORAL 2 TIMES DAILY
Qty: 30 TABLET | Refills: 0 | Status: SHIPPED | OUTPATIENT
Start: 2024-05-10

## 2024-05-10 RX ORDER — POTASSIUM CHLORIDE 20 MEQ/1
40 TABLET, EXTENDED RELEASE ORAL ONCE
Status: COMPLETED | OUTPATIENT
Start: 2024-05-10 | End: 2024-05-10

## 2024-05-10 RX ADMIN — METOCLOPRAMIDE 10 MG: 5 INJECTION, SOLUTION INTRAMUSCULAR; INTRAVENOUS at 15:25

## 2024-05-10 RX ADMIN — SODIUM CHLORIDE, SODIUM LACTATE, POTASSIUM CHLORIDE, AND CALCIUM CHLORIDE 1000 ML: .6; .31; .03; .02 INJECTION, SOLUTION INTRAVENOUS at 15:29

## 2024-05-10 RX ADMIN — IOHEXOL 85 ML: 350 INJECTION, SOLUTION INTRAVENOUS at 16:47

## 2024-05-10 RX ADMIN — DIPHENHYDRAMINE HYDROCHLORIDE 25 MG: 50 INJECTION, SOLUTION INTRAMUSCULAR; INTRAVENOUS at 15:24

## 2024-05-10 RX ADMIN — Medication 800 MG: at 16:36

## 2024-05-10 RX ADMIN — POTASSIUM CHLORIDE 40 MEQ: 1500 TABLET, EXTENDED RELEASE ORAL at 16:36

## 2024-05-10 NOTE — DISCHARGE INSTRUCTIONS
Your magnesium and potassium levels are both slightly low, which could be exacerbating symptoms.  Take the medications as prescribed, and have your doctor recheck your levels in about a week.  Follow-up with your primary care doctor for further evaluation of your symptoms.  Return if you develop complete loss of sensation, associated weakness, or for any other concerns.

## 2024-05-10 NOTE — ED PROVIDER NOTES
History  Chief Complaint   Patient presents with   • Numbness     Pt reports being seen here last week for same, with CP at the time. Follow up with PCP yesterday, began with numbness in face as well yesterday before visit.      HPI    Prior to Admission Medications   Prescriptions Last Dose Informant Patient Reported? Taking?   Xifaxan 550 MG tablet  Self Yes No   Sig: take 1 tablet by mouth three times a day for 14 days   Patient not taking: Reported on 2/8/2024   busPIRone (BUSPAR) 5 mg tablet   Yes No   Sig: Take 5 mg by mouth Three times a day   cetirizine (ZyrTEC) 10 MG chewable tablet  Self Yes No   Sig: Chew 10 mg daily as needed   ciprofloxacin (CIPRO) 500 mg tablet  Self Yes No   Patient not taking: Reported on 2/8/2024   colestipol (COLESTID) 1 g tablet  Self No No   Sig: Take 3 tablets (3 g total) by mouth 2 (two) times a day   diphenoxylate-atropine (LOMOTIL) 2.5-0.025 mg per tablet   No No   Sig: Take 1 tablet by mouth 2 (two) times a day as needed for diarrhea   doxycycline hyclate (VIBRA-TABS) 100 mg tablet  Self Yes No   Sig: take 1 tablet by mouth twice a day for 7 days   Patient not taking: Reported on 2/8/2024   ibuprofen (MOTRIN) 800 mg tablet  Self Yes No   ipratropium (ATROVENT) 0.03 % nasal spray  Self Yes No   Patient not taking: Reported on 2/8/2024   lisinopril (ZESTRIL) 10 mg tablet  Self Yes No   Sig: Take 1 tablet by mouth daily   Patient not taking: Reported on 2/8/2024   metoprolol succinate (TOPROL-XL) 25 mg 24 hr tablet  Self Yes No   Sig: Take 25 mg by mouth daily   modafinil (PROVIGIL) 100 mg tablet   Yes No   Sig: Take 100 mg by mouth 2 (two) times a day   nortriptyline (PAMELOR) 10 mg capsule   No No   Sig: Take 1 capsule (10 mg total) by mouth daily at bedtime   nystatin-triamcinolone (MYCOLOG-II) cream  Self Yes No   Patient not taking: Reported on 2/8/2024   omeprazole (PriLOSEC) 40 MG capsule  Self Yes No   Sig: Take 40 mg by mouth 2 (two) times a day   rizatriptan  (MAXALT-MLT) 10 mg disintegrating tablet  Self Yes No   Patient not taking: Reported on 2/8/2024   sildenafil (VIAGRA) 50 MG tablet  Self Yes No   Sig: Take 50 mg by mouth   tamsulosin (FLOMAX) 0.4 mg  Self Yes No   Sig: take 1 capsule by mouth nightly   Patient not taking: Reported on 2/8/2024      Facility-Administered Medications: None       History reviewed. No pertinent past medical history.    History reviewed. No pertinent surgical history.    Family History   Problem Relation Age of Onset   • No Known Problems Mother    • Prostate cancer Father      I have reviewed and agree with the history as documented.    E-Cigarette/Vaping   • E-Cigarette Use Never User      E-Cigarette/Vaping Substances     Social History     Tobacco Use   • Smoking status: Former     Types: Cigars   • Smokeless tobacco: Never   • Tobacco comments:     cigars currently on occasion.   Vaping Use   • Vaping status: Never Used   Substance Use Topics   • Alcohol use: Yes   • Drug use: Never       Review of Systems    Physical Exam  Physical Exam  Vitals and nursing note reviewed.   Constitutional:       General: He is not in acute distress.     Appearance: He is well-developed.   HENT:      Head: Normocephalic and atraumatic.   Eyes:      Extraocular Movements: Extraocular movements intact.      Conjunctiva/sclera: Conjunctivae normal.      Pupils: Pupils are equal, round, and reactive to light.   Neck:      Trachea: No tracheal deviation.   Cardiovascular:      Rate and Rhythm: Normal rate and regular rhythm.      Pulses:           Radial pulses are 2+ on the right side.      Heart sounds: Murmur heard.      Systolic murmur is present with a grade of 4/6.   Pulmonary:      Effort: Pulmonary effort is normal. No respiratory distress.      Breath sounds: Normal breath sounds.   Abdominal:      General: There is no distension.      Palpations: Abdomen is soft.      Tenderness: There is no abdominal tenderness.   Musculoskeletal:       Cervical back: Normal range of motion.      Right lower leg: No edema.      Left lower leg: No edema.   Skin:     General: Skin is warm and dry.   Neurological:      General: No focal deficit present.      Mental Status: He is alert and oriented to person, place, and time.      GCS: GCS eye subscore is 4. GCS verbal subscore is 5. GCS motor subscore is 6.      Cranial Nerves: No cranial nerve deficit, dysarthria or facial asymmetry.      Motor: Motor function is intact. No weakness or pronator drift.      Coordination: Finger-Nose-Finger Test and Heel to Shin Test normal. Rapid alternating movements normal.      Deep Tendon Reflexes:      Reflex Scores:       Bicep reflexes are 1+ on the right side and 1+ on the left side.       Brachioradialis reflexes are 1+ on the right side and 1+ on the left side.       Patellar reflexes are 1+ on the right side and 1+ on the left side.     Comments: Endorses decreased sensation to the anterior shin and thigh of the left leg, but equal sensation to the medial, lateral, and posterior legs.  Decree sensation to the back of the left hand and thumb, equal sensation to the palm and fifth finger, as well as throughout the rest of the left arm.  Equal sensation to the forehead gradually decreasing sensation down the face.  No areas of loss of sensation.   Psychiatric:         Mood and Affect: Mood and affect normal.         Behavior: Behavior normal.       Vital Signs  ED Triage Vitals   Temperature Pulse Respirations Blood Pressure SpO2   05/10/24 1301 05/10/24 1301 05/10/24 1301 05/10/24 1301 05/10/24 1301   98.1 °F (36.7 °C) 95 18 (!) 185/84 99 %      Temp Source Heart Rate Source Patient Position - Orthostatic VS BP Location FiO2 (%)   05/10/24 1301 05/10/24 1301 05/10/24 1301 05/10/24 1301 --   Oral Monitor Sitting Right arm       Pain Score       05/10/24 1609       7           Vitals:    05/10/24 1301 05/10/24 1530 05/10/24 1715   BP: (!) 185/84 132/63 127/59   Pulse: 95 80 67    Patient Position - Orthostatic VS: Sitting           Visual Acuity  Visual Acuity      Flowsheet Row Most Recent Value   L Pupil Size (mm) 4   R Pupil Size (mm) 3            ED Medications  Medications   metoclopramide (REGLAN) injection 10 mg (10 mg Intravenous Given 5/10/24 1525)   diphenhydrAMINE (BENADRYL) injection 25 mg (25 mg Intravenous Given 5/10/24 1524)   lactated ringers bolus 1,000 mL (1,000 mL Intravenous New Bag 5/10/24 1529)   magnesium Oxide (MAG-OX) tablet 800 mg (800 mg Oral Given 5/10/24 1636)   potassium chloride (Klor-Con M20) CR tablet 40 mEq (40 mEq Oral Given 5/10/24 1636)   iohexol (OMNIPAQUE) 350 MG/ML injection (MULTI-DOSE) 85 mL (85 mL Intravenous Given 5/10/24 1647)       Diagnostic Studies  Results Reviewed       Procedure Component Value Units Date/Time    Basic metabolic panel [[id number]]  (Abnormal) Collected: 05/10/24 1522    Lab Status: Final result Specimen: Blood from Arm, Left Updated: 05/10/24 1555     Sodium 139 mmol/L      Potassium 3.3 mmol/L      Chloride 111 mmol/L      CO2 24 mmol/L      ANION GAP 4 mmol/L      BUN 15 mg/dL      Creatinine 0.68 mg/dL      Glucose 77 mg/dL      Calcium 7.2 mg/dL      eGFR 124 ml/min/1.73sq m     Narrative:      National Kid[employer] Disease Foundation guidelines for Chronic Kidney Disease (CKD):   •  Stage 1 with normal or high GFR (GFR > 90 mL/min/1.73 square meters)  •  Stage 2 Mild CKD (GFR = 60-89 mL/min/1.73 square meters)  •  Stage 3A Moderate CKD (GFR = 45-59 mL/min/1.73 square meters)  •  Stage 3B Moderate CKD (GFR = 30-44 mL/min/1.73 square meters)  •  Stage 4 Severe CKD (GFR = 15-29 mL/min/1.73 square meters)  •  Stage 5 End Stage CKD (GFR <15 mL/min/1.73 square meters)  Note: GFR calculation is accurate only with a steady state creatinine    Magnesium [[id number]]  (Abnormal) Collected: 05/10/24 1522    Lab Status: Final result Specimen: Blood from Arm, Left Updated: 05/10/24 1555     Magnesium 1.6 mg/dL     CBC and differential  [[id number]]  (Abnormal) Collected: 05/10/24 1446    Lab Status: Final result Specimen: Blood from Arm, Left Updated: 05/10/24 1451     WBC 7.51 Thousand/uL      RBC 5.60 Million/uL      Hemoglobin 15.8 g/dL      Hematocrit 45.4 %      MCV 81 fL      MCH 28.2 pg      MCHC 34.8 g/dL      RDW 12.5 %      MPV 10.3 fL      Platelets 246 Thousands/uL      nRBC 0 /100 WBCs      Segmented % 72 %      Immature Grans % 0 %      Lymphocytes % 19 %      Monocytes % 8 %      Eosinophils Relative 1 %      Basophils Relative 0 %      Absolute Neutrophils 5.37 Thousands/µL      Absolute Immature Grans 0.02 Thousand/uL      Absolute Lymphocytes 1.43 Thousands/µL      Absolute Monocytes 0.58 Thousand/µL      Eosinophils Absolute 0.08 Thousand/µL      Basophils Absolute 0.03 Thousands/µL                    CTA head and neck with and without contrast   Final Result by Luanne Vila MD (05/10 4498)      1.  No acute intracranial CT abnormality.   2.  Unremarkable CT angiogram of the head and neck                  Workstation performed: [id number]                    Procedures  ECG 12 Lead Documentation Only    Date/Time: 5/10/2024 2:06 PM    Performed by: Joslyn Bran MD  Authorized by: Joslyn Bran MD    Indications / Diagnosis:  Paresthesias  ECG reviewed by me, the ED Provider: yes    Patient location:  ED  Previous ECG:     Previous ECG:  Compared to current    Comparison ECG info:  05/02/24    Similarity:  Changes noted  Interpretation:     Interpretation: non-specific    Rate:     ECG rate:  76    ECG rate assessment: normal    Rhythm:     Rhythm: sinus rhythm    Ectopy:     Ectopy: none    QRS:     QRS axis:  Normal    QRS intervals:  Normal  Conduction:     Conduction: normal    ST segments:     ST segments:  Normal  T waves:     T waves: non-specific and flattening      Flattening:  III           ED Course                                             Medical Decision Making  This is a  34-year-old male who presents here today for evaluation of left-sided paresthesias.  He says he was seen about a week ago for chest pain and had some left arm numbness associated with this which did improve.  He was better over the weekend but on 5/7 had recurrence of chest pain with left arm and leg paresthesias.  He saw his doctor yesterday who ordered a Lyme test that came back normal.  He says he is now having some paresthesias to the left side of his face.  He says he sometimes has problems where he feels like he cannot text as easily with his left hand but denies any other ataxia, coordination issues, unsteady gait.  He says chest pain has resolved.  He denies fevers or URI symptoms.  He endorses chronic diarrhea secondary to IBS which did improve after starting Lomotil about 6 weeks ago.  He still goes about twice a day.  He was started on BuSpar and Provigil about 6 weeks ago as well.  He denies any other new medications or changes in medications.  He denies known personal or family history of any neurologic disorders.  He does have a history of anxiety and does feel like recent symptoms have been making him more anxious than normal.  He does endorse a left-sided headache since 5/6.  He does get somewhat frequent headaches though they usually do not last this long.  It has not been bad enough that he feels like he needs to take anything for it.  He has never had any neurologic deficits with headaches before.  He denies recent head trauma, vision changes, nausea or vomiting, photophobia or phonophobia.  He has not taken anything for this headache since it started.    ROS: Otherwise negative, unless stated as above.    He is well-appearing, no acute distress.  He does have decreased sensation to the left compared to the right, however it is more of a peripheral distribution and throughout the entire extremity, and not dermatomal in nature.  This is less likely to be due to stroke, and with both arm and leg  involved is less likely to be due to peripheral neuropathy.  Given multiple nerve distributions this could be central process such as MS.  With some component of headache, this could be atypical migraine, less likely hemorrhage or space-occupying lesion.  He could have underlying vasculitis or vascular abnormality contributing to symptoms.  There is possibility of electrolyte abnormality given diarrhea.  Will check lab work and CT/CTA to evaluate.  Given duration of symptoms, he is not a TNK candidate.    Lab work does show mild hypokalemia and hypomagnesemia.  CT/CTA shows no acute abnormalities.  He endorses resolution of headache, and says paresthesias may have improved slightly, however as he has not been moving around it is difficult to say for sure.  I discussed with him findings, continued management at home, follow-up, and indications for return, and he expresses understanding with this plan.    Problems Addressed:  Hypokalemia: acute illness or injury  Hypomagnesemia: acute illness or injury  Left-sided headache: acute illness or injury  Paresthesias: acute illness or injury    Amount and/or Complexity of Data Reviewed  External Data Reviewed: notes.  Labs: ordered. Decision-making details documented in ED Course.  Radiology: ordered and independent interpretation performed. Decision-making details documented in ED Course.  ECG/medicine tests: ordered and independent interpretation performed. Decision-making details documented in ED Course.    Risk  OTC drugs.  Prescription drug management.           Disposition  Final diagnoses:   Paresthesias - left side   Left-sided headache   Hypokalemia   Hypomagnesemia     Time reflects when diagnosis was documented in both MDM as applicable and the Disposition within this note       Time User Action Codes Description Comment    5/10/2024  6:06 PM Joslyn Bran Add [R20.2] Paresthesias     5/10/2024  6:06 PM Joslyn Bran Modify [R20.2]  Paresthesias left side    5/10/2024  6:06 PM Joslyn Bran Add [R51.9] Left-sided headache     5/10/2024  6:06 PM Joslyn Bran Add [E87.6] Hypokalemia     5/10/2024  6:06 PM Joslyn Bran Add [E83.42] Hypomagnesemia           ED Disposition       ED Disposition   Discharge    Condition   Good    Date/Time   Fri May 10, 2024  5:47 PM    Comment   Alexx Montgomery discharge to home/self care.             Follow-up Information       Follow up With Specialties Details Why Contact Info Additional Information    [facility] Schedule an appointment as soon as possible for a visit  to follow up on your symptoms [address]  [address]  [facility] [address] [phone]       [facility] Schedule an appointment as soon as possible for a visit  to follow up on your symptoms [address]  [facility] [address] [phone] [facility],  [address], [place], [place], [address] [phone]            Patient's Medications   Discharge Prescriptions    MAGNESIUM OXIDE (MAG-OX) 400 MG TABLET    Take 1 tablet (400 mg total) by mouth 2 (two) times a day       Start Date: 5/10/2024 End Date: --       Order Dose: 400 mg       Quantity: 30 tablet    Refills: 0    POTASSIUM CHLORIDE (KLOR-CON M20) 20 MEQ TABLET    Take 1 tablet (20 mEq total) by mouth 2 (two) times a day       Start Date: 5/10/2024 End Date: --       Order Dose: 20 mEq       Quantity: 30 tablet    Refills: 0       No discharge procedures on file.    PDMP Review       None            ED Provider  Electronically Signed by             Joslyn Bran MD  05/11/24 0111

## 2024-05-13 ENCOUNTER — TELEPHONE (OUTPATIENT)
Dept: GASTROENTEROLOGY | Facility: CLINIC | Age: 34
End: 2024-05-13

## 2024-05-13 NOTE — TELEPHONE ENCOUNTER
----- Message from Gema Pope PA-C sent at 5/9/2024  9:10 AM EDT -----  Please call patient's cardiologist Dr Ren's office to ask if it's okay from a cardiac standpoint to prescribe Pamelor.  Patient is no longer going to take BuSpar or Celexa.  Thank you

## 2024-05-13 NOTE — TELEPHONE ENCOUNTER
As per Marisela Boyce RN :  Dr. Ren's office called back, spoke with boris Be. Is ok to have Pamelor per Dr. Ren

## 2024-05-13 NOTE — TELEPHONE ENCOUNTER
"Called Nurse [place] to # [phone] and LMOM with message as per Gema and to call us back at the office with any responses.  Office # provided       \"Please call patient's cardiologist Dr Ren's office to ask if it's okay from a cardiac standpoint to prescribe Pamelor. Patient is no longer going to take BuSpar or Celexa. Thank you\"  "

## 2024-05-14 ENCOUNTER — TELEPHONE (OUTPATIENT)
Dept: NEUROLOGY | Facility: CLINIC | Age: 34
End: 2024-05-14

## 2024-05-16 ENCOUNTER — OFFICE VISIT (OUTPATIENT)
Dept: NEUROLOGY | Facility: CLINIC | Age: 34
End: 2024-05-16
Payer: COMMERCIAL

## 2024-05-16 VITALS
TEMPERATURE: 98.2 F | DIASTOLIC BLOOD PRESSURE: 67 MMHG | BODY MASS INDEX: 35.81 KG/M2 | WEIGHT: 264.4 LBS | HEART RATE: 91 BPM | HEIGHT: 72 IN | SYSTOLIC BLOOD PRESSURE: 146 MMHG

## 2024-05-16 DIAGNOSIS — F40.240 CLAUSTROPHOBIA: Primary | ICD-10-CM

## 2024-05-16 DIAGNOSIS — M54.81 OCCIPITAL NEURALGIA: ICD-10-CM

## 2024-05-16 DIAGNOSIS — R20.0 LEFT SIDED NUMBNESS: ICD-10-CM

## 2024-05-16 PROBLEM — G47.10 HYPERSOMNIA: Status: ACTIVE | Noted: 2024-03-15

## 2024-05-16 PROBLEM — K29.70 GASTRITIS WITHOUT BLEEDING: Status: ACTIVE | Noted: 2022-10-27

## 2024-05-16 PROBLEM — Q23.81 BICUSPID AORTIC VALVE WITH ASCENDING AORTA 4.0 TO 4.5 CM IN DIAMETER: Status: ACTIVE | Noted: 2020-12-27

## 2024-05-16 PROBLEM — E66.09 CLASS 2 OBESITY DUE TO EXCESS CALORIES WITHOUT SERIOUS COMORBIDITY WITH BODY MASS INDEX (BMI) OF 36.0 TO 36.9 IN ADULT: Status: ACTIVE | Noted: 2021-02-25

## 2024-05-16 PROBLEM — G47.33 OSA (OBSTRUCTIVE SLEEP APNEA): Status: ACTIVE | Noted: 2022-11-14

## 2024-05-16 PROBLEM — E66.812 CLASS 2 OBESITY DUE TO EXCESS CALORIES WITHOUT SERIOUS COMORBIDITY WITH BODY MASS INDEX (BMI) OF 36.0 TO 36.9 IN ADULT: Status: ACTIVE | Noted: 2021-02-25

## 2024-05-16 PROBLEM — Q23.1 BICUSPID AORTIC VALVE WITH ASCENDING AORTA 4.0 TO 4.5 CM IN DIAMETER: Status: ACTIVE | Noted: 2020-12-27

## 2024-05-16 PROBLEM — R35.0 INCREASED FREQUENCY OF URINATION: Status: ACTIVE | Noted: 2023-08-06

## 2024-05-16 PROBLEM — I35.1 NONRHEUMATIC AORTIC VALVE INSUFFICIENCY: Status: ACTIVE | Noted: 2021-09-30

## 2024-05-16 PROBLEM — S83.249A TEAR OF MEDIAL MENISCUS OF KNEE: Status: ACTIVE | Noted: 2022-05-04

## 2024-05-16 PROBLEM — R01.1 MURMUR: Status: ACTIVE | Noted: 2021-02-25

## 2024-05-16 PROBLEM — F41.9 ANXIETY: Status: ACTIVE | Noted: 2021-02-25

## 2024-05-16 PROBLEM — M72.2 PLANTAR FASCIITIS: Status: ACTIVE | Noted: 2023-03-01

## 2024-05-16 PROBLEM — G47.19 EXCESSIVE DAYTIME SLEEPINESS: Status: ACTIVE | Noted: 2023-04-25

## 2024-05-16 PROBLEM — S86.819A STRAIN OF PATELLAR TENDON: Status: ACTIVE | Noted: 2022-05-04

## 2024-05-16 PROBLEM — Z72.0 TOBACCO ABUSE: Status: ACTIVE | Noted: 2021-02-25

## 2024-05-16 PROBLEM — I10 ESSENTIAL HYPERTENSION: Status: ACTIVE | Noted: 2021-02-25

## 2024-05-16 PROCEDURE — 99205 OFFICE O/P NEW HI 60 MIN: CPT | Performed by: PSYCHIATRY & NEUROLOGY

## 2024-05-16 RX ORDER — DEXAMETHASONE 2 MG/1
2 TABLET ORAL
Qty: 5 TABLET | Refills: 0 | Status: SHIPPED | OUTPATIENT
Start: 2024-05-16

## 2024-05-16 RX ORDER — LORAZEPAM 0.5 MG/1
TABLET ORAL
Qty: 2 TABLET | Refills: 0 | Status: SHIPPED | OUTPATIENT
Start: 2024-05-16

## 2024-05-16 NOTE — PROGRESS NOTES
Patient ID: Alexx Montgomery is a 34 y.o. male.    Assessment/Plan:    No problem-specific Assessment & Plan notes found for this encounter.       {Assess/PlanSmartLinks:76484}       Subjective:    HPI      {[facility] HPI texts:53140}    {Common ambulatory SmartLinks:65118}         Objective:    Blood pressure 146/67, pulse 91, temperature 98.2 °F (36.8 °C), temperature source Temporal, height 6' (1.829 m), weight 120 kg (264 lb 6.4 oz).    Physical Exam    Neurological Exam      ROS:    Review of Systems        Review of Systems   Constitutional:  Positive for fatigue. Negative for appetite change and fever.   HENT: Negative.  Negative for hearing loss, tinnitus, trouble swallowing and voice change.    Eyes: Negative.  Negative for photophobia, pain and visual disturbance.   Respiratory: Negative.  Negative for shortness of breath.    Cardiovascular: Negative.  Negative for palpitations.   Gastrointestinal: Negative.  Negative for nausea and vomiting.   Endocrine: Negative.  Negative for cold intolerance.   Genitourinary: Negative.  Negative for dysuria, frequency and urgency.   Musculoskeletal:  Positive for neck pain (Shoulder/chest pain Left). Negative for back pain, gait problem, myalgias and neck stiffness.   Skin: Negative.  Negative for rash.   Allergic/Immunologic: Negative.    Neurological:  Positive for facial asymmetry (Left sided facial numbness), numbness (Left leg and arm) and headaches. Negative for dizziness, tremors, seizures, syncope, speech difficulty, weakness and light-headedness.   Hematological: Negative.  Does not bruise/bleed easily.   Psychiatric/Behavioral: Negative.  Negative for confusion, hallucinations and sleep disturbance.    All other systems reviewed and are negative.

## 2024-05-16 NOTE — PATIENT INSTRUCTIONS
Abort current headache cycle will prescribe dexamethasone 2 mg x 5 days.  Take it in the morning with breakfast    To evaluate for MS and left-sided numbness/tingling will do an MRI brain MS protocol and MRI cervical spine    Lab work: REBECCA and Vitamin D     Watch netflix hack your health: The secrets of your gut

## 2024-05-16 NOTE — PROGRESS NOTES
"[facility] General Neurology Consult  PATIENT:  Alexx Montgomery (: 1990)  MRN:  [id number]  DATE OF SERVICE:  2024    REFERRED BY: Self, Referral  PMD: Franklin Patrick    Assessment/Plan:     Alexx Montgomery is a right-handed very pleasant  34 y.o.  right handed male with history of anxiety, asthma, bicuspid aortic valve, obesity, migraines, hypertension, hypersomnia, daytime sleepiness, increased urinary frequency, CANDIDO CPAP compliant, plantar fasciitis, gastritis, medial meniscus tear, testicular calcification, orchitis, hypogonadism, tobacco abuse and strain of the patellar tendon who presents today for evaluation of acute onset of left-sided numbness amongst other symptoms.    Patient reports symptom onset approximately 2 weeks ago.  He was getting his routine echocardiogram when he all of a sudden got pain in his left chest.  Symptoms progressed to numbness and tingling of the left arm, extreme fatigue after yard work and then numbness of the left leg followed by numbness of the left face.  Throughout this he reports having a headache on the left side of his head.  Other nonspecific symptoms he describes are having a loss of proprioception of his left hand, having a \"yellow hue\" in his visual fields, hands feeling cramps, increased urinary frequency, and feelings of disorientation. Patient followed up in the ED multiple times and with his PCP.  Workup including EKG, Lyme, chest x-ray, CTA, x-ray of the cervical spine, venous Doppler of the lower extremity, troponins amongst other was negative.  B12 314.  In the ED his potassium and magnesium were low.  Recent medication changes include BuSpar starting in mid March and were multiple starting 5 weeks ago.  After stopping the multiple some of his symptoms have improved.    On physical exam today patient with normal mental status.  Normal cranial nerve function.  No APD appreciated.  There was no weakness or fix on exam.  He has increased muscle tension " along cervical muscles.  He had decreased temperature sensation and pinprick sensation in the lateral distal lower extremity.  Patient with overall 1+ reflexes with exception of trace reflexes in the right patellar and 2+ reflexes tendon in the left patellar tendon.  His gait, balance and coordination were normal.    At this time, we have a 34-year-old male who presents with 2 weeks of progressive left-sided numbness/tingling paresthesias, severe fatigue and Uhthoffs phenomenon.  He has a history of increased urinary frequency and visual disturbances.  There were no upper motor neuron findings on exam however given his young age and history would like to rule out multiple sclerosis.  Other differentials include electrolyte imbalance, medication side effect versus anxiety.  His pain in the left side of his head could be secondary to an occipital neuralgia.    Workup:  Will do an MRI brain MS protocol with and without contrast  Given his involvement of the left upper and lower extremity I would like to also do an MRI of the cervical spine to rule out an isolated myelopathy  Will do a vitamin D level given low calcium  REBECCA given history of multisystemic involvement  Recommend taking vitamin B12 1000 mcg daily  Will give patient a course of Decadron 2 mg x 5 days to abort current headache cycle  Had an extensive conversation about healthy lifestyle habits  Encourage patient to have strict compliance with his anxiolytics  Follow-up in 3 months    History of Present Illness:   Alexx Montgomery is a right-handed very pleasant  34 y.o.  right handed male with history of anxiety, asthma, bicuspid aortic valve, obesity, migraines, hypertension, hypersomnia, daytime sleepiness, increased urinary frequency, CANDIDO CPAP compliant, plantar fasciitis, gastritis, medial meniscus tear, testicular calcification, orchitis, hypogonadism, tobacco abuse and strain of the patellar tendon who presents today for evaluation of acute onset of  left-sided numbness amongst other symptoms.    Initial presenting symptom:     Two weeks ago went to get his echo done due to his bicuspid AV that's leaking, no change from last year. During the exam he got a pain in his L chest. Thursday had f/u appointment, but the L arm started feeling with numbness and tingling. Wasn't cardiac related. Went home did yard work.  Was more tired than usual. Went to the ED, workup normal. Tuesday L leg started to feel numb, went to ED again, Workup normal. NIHSS 0, went back home. On thurday had appointment with PCP, he had L facial numbness. The whole time he has had a headacheache on the L side of this head. Lyme and CXR negative. Friday, ED potassium magnesium low. Told could be anxiety vs low potassium magnesium, Started vitamins. Told by doctor lomitil side effect. Has TMJ, 3 months ago jaw popped.     Loss of proprioception of his L hand.     Any changes in medication, medical history or major life stressors associated with symptom onset?Buspar started in mid march   Lomotil started 5 weeks ago     Associated symptoms:    Visual disturbances: wears contacts, sometimes gets a yellow hue to things   Weakness: Hands feel cramped   Balance issues: No    Frequent falls:No   Urinary symptoms: increased frequency   Uhthoffs phenomenon: Yes  Lhermittes phenomenon: No   Sensory disturbances: Yes    Fatigue: Very tired all the time  Cognitive disturbances: Disoriented   Difficulty driving:No    Lately does have neck pain     Workup:  Have you seen someone else for your symptoms?  Have you ever had any imaging or diagnostic workup? yes   Vitamin D:  Never drawn   Lyme: negative    Lifestyle:      Social:  Physical activity: Elliptical, tries to be outside, work is physically demanding   Tick bites: No   Water: Not enough due to increased urinary frequency   Caffeine: 1-2 cup per day  Mood: history of anxiety  ETOH: None   Tobacco:  Vaping working on quitting   Illicit drugs:  No  Work:  [employer]   Education: Bachelors in English.   Lives with: 3 YO boy and Wife is pregnant with a 12 week old boy   Kids: 1, 3 year old, wife is 12 week pregnant       The following portions of the patient's history were reviewed and updated as appropriate: allergies, current medications, past family history, past medical history, past social history, past surgical history and problem list.    Past Medical History:   History reviewed. No pertinent past medical history.    Patient Active Problem List   Diagnosis    Male erectile dysfunction, unspecified    Anxiety    Asthma, mild intermittent    Bicuspid aortic valve with ascending aorta 4.0 to 4.5 cm in diameter    Class 2 obesity due to excess calories without serious comorbidity with body mass index (BMI) of 36.0 to 36.9 in adult    Classic migraine with aura    Essential hypertension    Excessive daytime sleepiness    Hypersomnia    Increased frequency of urination    Murmur    Nonrheumatic aortic valve insufficiency    CANDIDO (obstructive sleep apnea)    Plantar fasciitis    Gastritis without bleeding    Tear of medial meniscus of knee    Testicular calcification    Orchitis    Testicular hypogonadism    Tobacco abuse    Strain of patellar tendon       Medications:      Current Outpatient Medications   Medication Sig Dispense Refill    busPIRone (BUSPAR) 5 mg tablet Take 5 mg by mouth Three times a day      colestipol (COLESTID) 1 g tablet Take 3 tablets (3 g total) by mouth 2 (two) times a day 180 tablet 6    diphenoxylate-atropine (LOMOTIL) 2.5-0.025 mg per tablet Take 1 tablet by mouth 2 (two) times a day as needed for diarrhea 60 tablet 0    magnesium oxide (MAG-OX) 400 mg tablet Take 1 tablet (400 mg total) by mouth 2 (two) times a day 30 tablet 0    metoprolol succinate (TOPROL-XL) 25 mg 24 hr tablet Take 25 mg by mouth daily      modafinil (PROVIGIL) 100 mg tablet Take 100 mg by mouth 2 (two) times a day      omeprazole (PriLOSEC) 40 MG capsule Take  40 mg by mouth 2 (two) times a day      potassium chloride (Klor-Con M20) 20 mEq tablet Take 1 tablet (20 mEq total) by mouth 2 (two) times a day 30 tablet 0    sildenafil (VIAGRA) 50 MG tablet Take 50 mg by mouth      cetirizine (ZyrTEC) 10 MG chewable tablet Chew 10 mg daily as needed (Patient not taking: Reported on 5/16/2024)      ciprofloxacin (CIPRO) 500 mg tablet  (Patient not taking: Reported on 2/8/2024)      doxycycline hyclate (VIBRA-TABS) 100 mg tablet take 1 tablet by mouth twice a day for 7 days (Patient not taking: Reported on 2/8/2024)      ibuprofen (MOTRIN) 800 mg tablet  (Patient not taking: Reported on 5/16/2024)      ipratropium (ATROVENT) 0.03 % nasal spray  (Patient not taking: Reported on 2/8/2024)      lisinopril (ZESTRIL) 10 mg tablet Take 1 tablet by mouth daily (Patient not taking: Reported on 2/8/2024)      nortriptyline (PAMELOR) 10 mg capsule Take 1 capsule (10 mg total) by mouth daily at bedtime (Patient not taking: Reported on 5/16/2024) 90 capsule 0    nystatin-triamcinolone (MYCOLOG-II) cream  (Patient not taking: Reported on 2/8/2024)      rizatriptan (MAXALT-MLT) 10 mg disintegrating tablet  (Patient not taking: Reported on 2/8/2024)      tamsulosin (FLOMAX) 0.4 mg take 1 capsule by mouth nightly (Patient not taking: Reported on 2/8/2024)       No current facility-administered medications for this visit.        Allergies:      Allergies   Allergen Reactions    Aspirin Shortness Of Breath     Asthma attack    Penicillins      No reaction. Parents allergic.         Family History:     Family History   Problem Relation Age of Onset    No Known Problems Mother     Prostate cancer Father        Social History:       Social History     Socioeconomic History    Marital status: /Civil Union     Spouse name: Not on file    Number of children: Not on file    Years of education: Not on file    Highest education level: Not on file   Occupational History    Not on file   Tobacco Use     Smoking status: Former     Types: Cigars    Smokeless tobacco: Never    Tobacco comments:     cigars currently on occasion.   Vaping Use    Vaping status: Never Used   Substance and Sexual Activity    Alcohol use: Not Currently    Drug use: Never    Sexual activity: Not on file   Other Topics Concern    Not on file   Social History Narrative    Not on file     Social Determinants of Health     Financial Resource Strain: Low Risk  (4/25/2023)    [facility]    Overall Financial Resource Strain (CARDIA)     Difficulty of Paying Living Expenses: Not very hard   Food Insecurity: No Food Insecurity (4/25/2023)    [facility], [facility]    Hunger Vital Sign     Worried About Running Out of Food in the Last Year: Never true     Ran Out of Food in the Last Year: Never true   Transportation Needs: No Transportation Needs (4/25/2023)    [facility] from [facility], [facility]    PRAPARE - Transportation     Lack of Transportation (Medical): No     Lack of Transportation (Non-Medical): No   Physical Activity: Insufficiently Active (4/25/2023)    Received from [facility]    Exercise Vital Sign     Days of Exercise per Week: 3 days     Minutes of Exercise per Session: 30 min   Stress: Stress Concern Present (4/25/2023)    [facility] from [facility], [facility]    [employer] - Occupational Stress Questionnaire     Feeling of Stress : Rather much   Social Connections: Unknown (4/25/2023)    [facility] from [facility], [facility]    Social Connection and Isolation Panel [NHANES]     Frequency of Communication with Friends and Family: Twice a week     Frequency of Social Gatherings with Friends and Family: Twice a week     Attends Orthodox Services: Patient declined     Active  Member of Clubs or Organizations: Patient declined     Attends Club or Organization Meetings: Patient declined     Marital Status:    Intimate Partner Violence: Not At Risk (4/25/2023)    [facility] from [facility], [facility]    Humiliation, Afraid, Rape, and Kick questionnaire     Fear of Current or Ex-Partner: No     Emotionally Abused: No     Physically Abused: No     Sexually Abused: No   Housing Stability: Low Risk  (4/25/2023)    [facility] from [facility], [facility]    Housing Stability Vital Sign     Unable to Pay for Housing in the Last Year: No     Number of Places Lived in the Last Year: 1     Unstable Housing in the Last Year: No         Objective:   Blood pressure 146/67, pulse 91, temperature 98.2 °F (36.8 °C), temperature source Temporal, height 6' (1.829 m), weight 120 kg (264 lb 6.4 oz).    Physical Exam  Eyes:      General: Lids are normal.      Extraocular Movements: Extraocular movements intact.      Pupils: Pupils are equal, round, and reactive to light.   Cardiovascular:      Rate and Rhythm: Normal rate and regular rhythm.      Heart sounds: Murmur heard.      Systolic murmur is present.      Comments: Right parasternal murmur   Neurological:      Motor: Motor strength is normal.     Coordination: Romberg sign negative.      Deep Tendon Reflexes:      Reflex Scores:       Bicep reflexes are 1+ on the right side and 1+ on the left side.       Brachioradialis reflexes are 1+ on the right side and 1+ on the left side.       Patellar reflexes are 2+ on the left side.       Achilles reflexes are 1+ on the right side and 1+ on the left side.  Psychiatric:         Speech: Speech normal.         Neurological Exam  Mental Status  Awake, alert and oriented to person, place and time. Oriented to person, place and time. Recent and remote memory are intact. Speech is normal. Language is fluent with no aphasia. Attention and  concentration are normal. Fund of knowledge is appropriate for level of education.    Cranial Nerves  CN II: Visual fields full to confrontation.  CN III, IV, VI: Extraocular movements intact bilaterally. Normal lids and orbits bilaterally. Pupils equal round and reactive to light bilaterally.  CN V: Facial sensation is normal.  CN VII: Full and symmetric facial movement.  CN VIII: Hearing is normal.  CN IX, X: Palate elevates symmetrically  CN XI: Shoulder shrug strength is normal.  CN XII: Tongue midline without atrophy or fasciculations.  No APD.    Motor   Strength is 5/5 throughout all four extremities.  No fix.    Sensory  Light touch is normal in upper and lower extremities. Pinprick abnormality: Temperature abnormality: Vibration is normal in upper and lower extremities. Proprioception is normal in upper and lower extremities.   Decreased temperature sensation in lateral distal lower extremmity .    Reflexes                                            Right                      Left  Brachioradialis                    1+                         1+  Biceps                                 1+                         1+  Patellar                                Tr                         2+  Achilles                                1+                         1+    Right pathological reflexes: Shantal's absent.  Left pathological reflexes: Shantal's absent.    Coordination  Right: Finger-to-nose normal.Left: Finger-to-nose normal.    Gait  Casual gait is normal including stance, stride, and arm swing. Normal tandem gait. Romberg is absent. Able to rise from chair without using arms.        Pertinent lab results: Vitamin B12 314  Magnesium 1.6  Calcium 7.2  Potassium 3.3  MCV 81     Imaging:     CTA 5/10/2024: Within normal limits  X-ray cervical spine 5/9/2024: 5 views of the cervical spine demonstrate some mild   reversal of the normal cervical lordosis. Disc spaces are well-maintained. The   prevertebral soft  tissues are grossly unremarkable.        Review of Systems:     Review of Systems       Review of Systems   Constitutional:  Positive for fatigue. Negative for appetite change and fever.   HENT: Negative.  Negative for hearing loss, tinnitus, trouble swallowing and voice change.    Eyes: Negative.  Negative for photophobia, pain and visual disturbance.   Respiratory: Negative.  Negative for shortness of breath.    Cardiovascular: Negative.  Negative for palpitations.   Gastrointestinal: Negative.  Negative for nausea and vomiting.   Endocrine: Negative.  Negative for cold intolerance.   Genitourinary: Negative.  Negative for dysuria, frequency and urgency.   Musculoskeletal:  Positive for neck pain (Shoulder/chest pain Left). Negative for back pain, gait problem, myalgias and neck stiffness.   Skin: Negative.  Negative for rash.   Allergic/Immunologic: Negative.    Neurological:  Positive for facial asymmetry (Left sided facial numbness), numbness (Left leg and arm) and headaches. Negative for dizziness, tremors, seizures, syncope, speech difficulty, weakness and light-headedness.   Hematological: Negative.  Does not bruise/bleed easily.   Psychiatric/Behavioral: Negative.  Negative for confusion, hallucinations and sleep disturbance.    All other systems reviewed and are negative.      *History obtained from patient as well as available medical record review    Author:  Vikki Casper M.D. 5/16/2024 8:20 AM

## 2024-05-17 PROBLEM — R20.0 LEFT SIDED NUMBNESS: Status: ACTIVE | Noted: 2024-05-17

## 2024-05-22 LAB — 25(OH)D3+25(OH)D2 SERPL-MCNC: 18 NG/ML (ref 30–100)

## 2024-05-24 LAB — ANA SER QL IF: NORMAL

## 2024-05-25 ENCOUNTER — HOSPITAL ENCOUNTER (OUTPATIENT)
Facility: MEDICAL CENTER | Age: 34
Discharge: HOME | End: 2024-05-25
Payer: COMMERCIAL

## 2024-05-25 DIAGNOSIS — R20.0 LEFT SIDED NUMBNESS: ICD-10-CM

## 2024-05-25 PROCEDURE — 72141 MRI NECK SPINE W/O DYE: CPT

## 2024-05-25 PROCEDURE — 70551 MRI BRAIN STEM W/O DYE: CPT

## 2024-06-19 ENCOUNTER — NURSE TRIAGE (OUTPATIENT)
Age: 34
End: 2024-06-19

## 2024-06-19 DIAGNOSIS — K21.9 GASTROESOPHAGEAL REFLUX DISEASE, UNSPECIFIED WHETHER ESOPHAGITIS PRESENT: Primary | ICD-10-CM

## 2024-06-19 RX ORDER — PANTOPRAZOLE SODIUM 40 MG/1
40 TABLET, DELAYED RELEASE ORAL 2 TIMES DAILY
Qty: 60 TABLET | Refills: 2 | Status: SHIPPED | OUTPATIENT
Start: 2024-06-19

## 2024-06-19 NOTE — TELEPHONE ENCOUNTER
"  Last OV: 5/9/24 IBS-D  Last Colonoscopy: n/a  Last EGD: n/a    Next OV:8/8/24    Pt. Calling regarding results of [place] UGI with air routine at [facility] on 6/29/23 which showed Small to moderate gastroesophageal reflux , Question of a small sliding hiatal hernia. Pt. Taking omeprazole daily for the past 3 years from a provider he does not see any longer, pt. Having pain to front and side of neck and is asking if this is something that can be related to his hiatal hernia, pt. Has f/u in office 8/8/24, pt. Has lost 35 lb since start of year, exercising more and changing diet, advised that a hiatal hernia can cause chest discomfort and worsening GERD symptoms, pt. Does have cardiac issues which he is being managed for and feels his symptoms are more related to hernia or GERD, please advise if he needs to be seen in office sooner than August     Reason for Disposition   Nursing judgment    Answer Assessment - Initial Assessment Questions  1. REASON FOR CALL or QUESTION: \"What is your reason for calling today?\" or \"How can I best help you?\" or \"What question do you have that I can help answer?\"        Pt. Wanted to make provider aware that he believes his hiatal hernia is causing more pain in his neck , pt. Has a f/u in August but didn't want to wait till his f/u    Protocols used: Information Only Call - No Triage-ADULT-[place]    "

## 2024-06-19 NOTE — TELEPHONE ENCOUNTER
Spoke with pt he is willing to try pantoprazole. He is also asking that provider review Radiology report from 6/29/23 [facility] in care everywhere

## 2024-06-19 NOTE — TELEPHONE ENCOUNTER
Send pantoprazole this will replace omeprazole.  I do not see the results in care everywhere for the upper GI series yet.

## 2024-06-19 NOTE — TELEPHONE ENCOUNTER
Hi, please let him know that reflux typically does not cause neck pain.  If he is willing, we can switch his omeprazole to another medication like pantoprazole to see if it will help more with his reflux.  Sometimes more on the same medication for a long time, he can lose its effectiveness.  Thank so much!

## 2024-06-20 DIAGNOSIS — E55.9 VITAMIN D DEFICIENCY: Primary | ICD-10-CM

## 2024-06-20 RX ORDER — ERGOCALCIFEROL 1.25 MG/1
50000 CAPSULE ORAL WEEKLY
Qty: 10 CAPSULE | Refills: 0 | Status: SHIPPED | OUTPATIENT
Start: 2024-06-20

## 2024-08-08 ENCOUNTER — TELEPHONE (OUTPATIENT)
Dept: NEUROLOGY | Facility: CLINIC | Age: 34
End: 2024-08-08

## 2024-08-08 ENCOUNTER — OFFICE VISIT (OUTPATIENT)
Dept: GASTROENTEROLOGY | Facility: CLINIC | Age: 34
End: 2024-08-08
Payer: COMMERCIAL

## 2024-08-08 VITALS
TEMPERATURE: 98 F | HEIGHT: 71 IN | SYSTOLIC BLOOD PRESSURE: 140 MMHG | HEART RATE: 86 BPM | BODY MASS INDEX: 33.04 KG/M2 | WEIGHT: 236 LBS | DIASTOLIC BLOOD PRESSURE: 84 MMHG | RESPIRATION RATE: 18 BRPM | OXYGEN SATURATION: 98 %

## 2024-08-08 DIAGNOSIS — K21.9 GASTROESOPHAGEAL REFLUX DISEASE, UNSPECIFIED WHETHER ESOPHAGITIS PRESENT: Primary | ICD-10-CM

## 2024-08-08 DIAGNOSIS — F41.9 ANXIETY: ICD-10-CM

## 2024-08-08 DIAGNOSIS — K58.0 IRRITABLE BOWEL SYNDROME WITH DIARRHEA: ICD-10-CM

## 2024-08-08 PROCEDURE — 99214 OFFICE O/P EST MOD 30 MIN: CPT | Performed by: PHYSICIAN ASSISTANT

## 2024-08-08 RX ORDER — VENLAFAXINE HYDROCHLORIDE 37.5 MG/1
37.5 CAPSULE, EXTENDED RELEASE ORAL DAILY
COMMUNITY
Start: 2024-07-25 | End: 2025-07-25

## 2024-08-08 RX ORDER — DOXYCYCLINE HYCLATE 100 MG
100 TABLET ORAL 2 TIMES DAILY
COMMUNITY
Start: 2024-08-02 | End: 2024-08-16

## 2024-08-08 RX ORDER — CEFUROXIME AXETIL 500 MG/1
500 TABLET ORAL 2 TIMES DAILY
COMMUNITY
Start: 2024-05-17

## 2024-08-08 NOTE — PROGRESS NOTES
[facility]  Alexx Montgomery 34 y.o. male MRN: [id number]       CC: Follow-up    HPI: Alxex is a 34-year-old male with history of IBS with diarrhea, anxiety, hyperlipidemia, hypertension, and follows with [facility] cardiology for history of bicuspid aortic valve/nonrheumatic aortic valve insufficiency.  Patient presents to the office today for follow-up.  He has lost 30 pounds since he was last seen, and has been focusing on eating whole foods and exercise by direction of his cardiologist.  Patient reports that he discontinued colestipol, and the numbness/tingling symptoms that he was having may have been secondary to Lomotil.  Symptoms stopped 2 weeks after he discontinued the medication after consulting with his pharmacist.  He did have neurology workup, which was fortunately negative.  Patient reports that he still has epigastric discomfort and a chest discomfort.  He has discussed with his cardiologist, and stated that this is noncardiac.  For anxiety and depression the patient is taking Effexor.  Patient reports that he is looking into finding a therapist.  Patient still has a lot of anxiety regarding being able to find a bathroom in time if he has urgency.    Patient underwent EGD and colonoscopy through  GI in August 2022.  Although the reports are not available to me, fortunately the pathology is.  Patient had evidence of duodenitis and gastritis.  He had a random colon biopsy that was negative for microscopic colitis and a single polyp removed, which was a tubular adenoma.  In addition he had fecal calprotectin testing that was normal.      Review of Systems:    CONSTITUTIONAL: Denies any fever, chills, or rigors. Good appetite, and no recent weight loss.  HEENT: No earache or tinnitus. Denies hearing loss or visual disturbances.  CARDIOVASCULAR: No chest pain or palpitations.   RESPIRATORY: Denies any cough, hemoptysis, shortness of breath or dyspnea on exertion.  GASTROINTESTINAL:  As noted in the History of Present Illness.   GENITOURINARY: No problems with urination. Denies any hematuria or dysuria.  NEUROLOGIC: No dizziness or vertigo, denies headaches.   MUSCULOSKELETAL: Denies any muscle or joint pain.   SKIN: Denies skin rashes or itching.   ENDOCRINE: Denies excessive thirst. Denies intolerance to heat or cold.  PSYCHOSOCIAL: Denies depression or anxiety. Denies any recent memory loss.       Current Outpatient Medications   Medication Sig Dispense Refill    busPIRone (BUSPAR) 5 mg tablet Take 5 mg by mouth Three times a day      cetirizine (ZyrTEC) 10 MG chewable tablet Chew 10 mg daily as needed      colestipol (COLESTID) 1 g tablet Take 3 tablets (3 g total) by mouth 2 (two) times a day 180 tablet 6    dexamethasone (DECADRON) 2 mg tablet Take 1 tablet (2 mg total) by mouth daily with breakfast 5 tablet 0    diphenoxylate-atropine (LOMOTIL) 2.5-0.025 mg per tablet Take 1 tablet by mouth 2 (two) times a day as needed for diarrhea 60 tablet 0    ergocalciferol (VITAMIN D2) 50,000 units Take 1 capsule (50,000 Units total) by mouth once a week 10 capsule 0    LORazepam (Ativan) 0.5 mg tablet Take one tab half hour prior to MRI and another tab if still anxious immediately prior to going into MRI 2 tablet 0    magnesium oxide (MAG-OX) 400 mg tablet Take 1 tablet (400 mg total) by mouth 2 (two) times a day 30 tablet 0    metoprolol succinate (TOPROL-XL) 25 mg 24 hr tablet Take 25 mg by mouth daily      modafinil (PROVIGIL) 100 mg tablet Take 100 mg by mouth 2 (two) times a day      nortriptyline (PAMELOR) 10 mg capsule Take 1 capsule (10 mg total) by mouth daily at bedtime (Patient not taking: Reported on 5/16/2024) 90 capsule 0    pantoprazole (PROTONIX) 40 mg tablet Take 1 tablet (40 mg total) by mouth 2 (two) times a day 60 tablet 2    potassium chloride (Klor-Con M20) 20 mEq tablet Take 1 tablet (20 mEq total) by mouth 2 (two) times a day 30 tablet 0    rizatriptan (MAXALT-MLT) 10 mg  disintegrating tablet  (Patient not taking: Reported on 2/8/2024)      sildenafil (VIAGRA) 50 MG tablet Take 50 mg by mouth       No current facility-administered medications for this visit.     No past medical history on file.  No past surgical history on file.  Social History     Socioeconomic History    Marital status: /Civil Union     Spouse name: Not on file    Number of children: Not on file    Years of education: Not on file    Highest education level: Not on file   Occupational History    Not on file   Tobacco Use    Smoking status: Former     Types: Cigars    Smokeless tobacco: Never    Tobacco comments:     cigars currently on occasion.   Vaping Use    Vaping status: Never Used   Substance and Sexual Activity    Alcohol use: Not Currently    Drug use: Never    Sexual activity: Not on file   Other Topics Concern    Not on file   Social History Narrative    Not on file     Social Determinants of Health     Financial Resource Strain: Medium Risk (6/17/2024)    Received from [facility]    Overall Financial Resource Strain (CARDIA)     Difficulty of Paying Living Expenses: Somewhat hard   Food Insecurity: Food Insecurity Present (6/17/2024)    Received from [facility]    Hunger Vital Sign     Worried About Running Out of Food in the Last Year: Sometimes true     Ran Out of Food in the Last Year: Never true   Transportation Needs: No Transportation Needs (6/17/2024)    Received from [facility]    PRAPARE - Transportation     Lack of Transportation (Medical): No     Lack of Transportation (Non-Medical): No   Physical Activity: Insufficiently Active (4/25/2023)    Received from [facility]    Exercise Vital Sign     Days of Exercise per Week: 3 days     Minutes of Exercise per Session: 30 min   Stress: Stress Concern Present (6/17/2024)    Received from [facility]    [employer] -  Occupational Stress Questionnaire     Feeling of Stress : Rather much   Social Connections: Feeling Socially Integrated (6/17/2024)    Received from [facility]    OASIS : Social Isolation     How often do you feel lonely or isolated from those around you?: Rarely   Intimate Partner Violence: Not At Risk (6/17/2024)    Received from [facility]    Humiliation, Afraid, Rape, and Kick questionnaire     Fear of Current or Ex-Partner: No     Emotionally Abused: No     Physically Abused: No     Sexually Abused: No   Housing Stability: Low Risk  (6/17/2024)    Received from [facility]    Housing Stability Vital Sign     Unable to Pay for Housing in the Last Year: No     Number of Times Moved in the Last Year: 0     Homeless in the Last Year: No     Family History   Problem Relation Age of Onset    No Known Problems Mother     Prostate cancer Father             PHYSICAL EXAM:    There were no vitals filed for this visit.  General Appearance:   Alert and oriented x 3. Cooperative, and in no respiratory distress   HEENT:   Normocephalic, atraumatic, anicteric.     Neck:  Supple, symmetrical, trachea midline   Lungs:   Clear to auscultation bilaterally    Heart::   Regular rate and rhythm   Abdomen:   Soft, non-tender, non-distended; normal bowel sounds; no masses, no organomegaly    Genitalia:   Deferred    Rectal:   Deferred    Extremities:  No cyanosis, clubbing or edema    Pulses:  2+ and symmetric all extremities    Skin:  Skin color, texture, turgor normal, no rashes or lesions    Lymph nodes:  No palpable cervical or supraclavicular lymphadenopathy        Lab Results:   Results from last 6 Months   Lab Units 05/10/24  1446   WBC Thousand/uL 7.51   HEMOGLOBIN g/dL 15.8   HEMATOCRIT % 45.4   PLATELETS Thousands/uL 246   SEGS PCT % 72   LYMPHO PCT % 19   MONO PCT % 8   EOS PCT % 1     Results from last 6 Months   Lab Units 06/17/24  0936 05/10/24  1522  05/07/24  0908   POTASSIUM mmol/L 4.5   < > 4.1   CHLORIDE mmol/L 104   < > 104   CO2 mmol/L 24   < > 28   BUN mg/dL 15   < > 16   CREATININE mg/dL 1.01   < > 0.94   CALCIUM mg/dL 9.6   < > 9.7   ALK PHOS U/L  --   --  97   ALT U/L  --   --  18   AST U/L  --   --  15    < > = values in this interval not displayed.               Imaging Studies:   MRI brain MS wo contrast    Result Date: 5/26/2024  Impression: A few punctate white matter hyperintensities are seen within the periphery of the cerebral hemispheres, nonspecific for a patient of this age. The pattern of disease is not diagnostic of demyelinating disease which is not excluded. Other possible etiologies would include precocious chronic microangiopathic change. Workstation performed: [id number]     MRI cervical spine wo contrast    Result Date: 5/26/2024  Impression: Small C5-6 disc herniation. No canal stenosis or foraminal nerve impingement. Normal cervical cord. Workstation performed: [id number]       ASSESSMENT and PLAN:      1) Chest discomfort, epigastric discomfort; hiatal hernia and GERD - I suspect as the patient continues to lose weight, his reflux symptoms will significantly improved.  Does feel that switching from omeprazole to pantoprazole was helpful.  - Pantoprazole once daily  - Update barium swallow  - Congratulated patient on his weight loss and continue lifestyle changes  - Follow-up with cardiology as scheduled    2) IBS-D, anxiety - Patient failed Lomotil and colestipol.  We discussed trial of Viberzi, but patient would like to defer secondary to risk of pancreatitis.  Lotronex would be absolutely last line, there is black box warning for ischemic colitis.  Patient would like to continue with taking Imodium as needed.  Patient reports that he is making much healthier diet choices, eating more whole foods which he thinks is also helping his IBS.  Patient reports that he is also thinking about seeing a virtual therapist since it is available  "through his insurance company.      Follow up in 3 months.      Portions of the record may have been created with voice recognition software.  Occasional wrong word or \"sound a like\" substitutions may have occurred due to the inherent limitations of voice recognition software.  Read the chart carefully and recognize, using context, where substitutions have occurred.  "

## 2024-08-08 NOTE — TELEPHONE ENCOUNTER
LMOM for pt to confirm their 8am  appt on   8/19  jen/ [place] . Reminded pt to arrive 15 mins prior to appt to check in with . Gave call back number [phone] to cancel/reschedule.

## 2024-08-14 ENCOUNTER — TELEPHONE (OUTPATIENT)
Dept: NEUROLOGY | Facility: CLINIC | Age: 34
End: 2024-08-14

## 2024-08-14 NOTE — TELEPHONE ENCOUNTER
Called to r/s her appt as dr Casper is not in office called and left very detail message to r/s with her directly

## 2024-08-15 ENCOUNTER — TELEPHONE (OUTPATIENT)
Dept: NEUROLOGY | Facility: CLINIC | Age: 34
End: 2024-08-15

## 2024-08-21 ENCOUNTER — HOSPITAL ENCOUNTER (OUTPATIENT)
Dept: RADIOLOGY | Facility: HOSPITAL | Age: 34
Discharge: HOME | End: 2024-08-21
Payer: COMMERCIAL

## 2024-08-21 DIAGNOSIS — K21.9 GASTROESOPHAGEAL REFLUX DISEASE, UNSPECIFIED WHETHER ESOPHAGITIS PRESENT: ICD-10-CM

## 2024-08-21 PROCEDURE — 74220 X-RAY XM ESOPHAGUS 1CNTRST: CPT

## 2024-09-05 ENCOUNTER — TELEPHONE (OUTPATIENT)
Dept: NEUROLOGY | Facility: CLINIC | Age: 34
End: 2024-09-05

## 2024-09-24 ENCOUNTER — TELEPHONE (OUTPATIENT)
Dept: NEUROLOGY | Facility: CLINIC | Age: 34
End: 2024-09-24

## 2024-09-24 NOTE — TELEPHONE ENCOUNTER
Called pt to r/s as narednra is not in office 10 weeks. Pt no longer wants to see [facility] would like to cancel and go elsewhere due to the amount of times we've had to cancel. Can a nurse contact the pt to give him results please.

## 2024-09-25 DIAGNOSIS — K21.9 GASTROESOPHAGEAL REFLUX DISEASE, UNSPECIFIED WHETHER ESOPHAGITIS PRESENT: ICD-10-CM

## 2024-09-25 RX ORDER — PANTOPRAZOLE SODIUM 40 MG/1
40 TABLET, DELAYED RELEASE ORAL 2 TIMES DAILY
Qty: 60 TABLET | Refills: 5 | Status: SHIPPED | OUTPATIENT
Start: 2024-09-25

## 2024-09-25 NOTE — TELEPHONE ENCOUNTER
Reason for call:   [x] Refill   [] Prior Auth  [] Other:     Office:   [] PCP/Provider -   [x] Specialty/Provider - [facility] / Gema Pope PA-C     Medication: pantoprazole (PROTONIX) 40 mg tablet     Dose/Frequency: Take 1 tablet (40 mg total) by mouth 2 (two) times a day     Quantity: 180 tablets + 1 refill    Pharmacy: [id number] - [address] [facility] [address]     Does the patient have enough for 3 days?   [] Yes   [x] No - Send as HP to POD

## 2024-09-25 NOTE — TELEPHONE ENCOUNTER
Called re: below to review blood work and spoke with pt, who verbalized an understanding. No questions at this time.

## 2024-11-07 ENCOUNTER — TELEPHONE (OUTPATIENT)
Dept: GASTROENTEROLOGY | Facility: CLINIC | Age: 34
End: 2024-11-07

## 2024-11-07 ENCOUNTER — OFFICE VISIT (OUTPATIENT)
Dept: GASTROENTEROLOGY | Facility: CLINIC | Age: 34
End: 2024-11-07
Payer: COMMERCIAL

## 2024-11-07 VITALS
OXYGEN SATURATION: 98 % | SYSTOLIC BLOOD PRESSURE: 128 MMHG | HEIGHT: 71 IN | DIASTOLIC BLOOD PRESSURE: 78 MMHG | RESPIRATION RATE: 18 BRPM | BODY MASS INDEX: 32.17 KG/M2 | HEART RATE: 81 BPM | WEIGHT: 229.8 LBS | TEMPERATURE: 97.7 F

## 2024-11-07 DIAGNOSIS — Z86.0100 HISTORY OF COLON POLYPS: Primary | ICD-10-CM

## 2024-11-07 DIAGNOSIS — K44.9 HIATAL HERNIA: ICD-10-CM

## 2024-11-07 DIAGNOSIS — K21.9 GASTROESOPHAGEAL REFLUX DISEASE, UNSPECIFIED WHETHER ESOPHAGITIS PRESENT: ICD-10-CM

## 2024-11-07 PROCEDURE — 99213 OFFICE O/P EST LOW 20 MIN: CPT | Performed by: PHYSICIAN ASSISTANT

## 2024-11-07 RX ORDER — METHYLPREDNISOLONE 4 MG/1
TABLET ORAL
COMMUNITY
Start: 2024-09-19

## 2024-11-07 RX ORDER — CIPROFLOXACIN AND DEXAMETHASONE 3; 1 MG/ML; MG/ML
SUSPENSION/ DROPS AURICULAR (OTIC)
COMMUNITY
Start: 2024-08-08

## 2024-11-07 RX ORDER — SOLRIAMFETOL 75 MG/1
75 TABLET, FILM COATED ORAL DAILY
COMMUNITY

## 2024-11-07 RX ORDER — DESLORATADINE 5 MG/1
5 TABLET ORAL DAILY
COMMUNITY
Start: 2024-09-20

## 2024-11-07 RX ORDER — BENZONATATE 200 MG/1
200 CAPSULE ORAL 3 TIMES DAILY PRN
COMMUNITY
Start: 2024-09-20

## 2024-11-07 RX ORDER — AZITHROMYCIN 250 MG/1
TABLET, FILM COATED ORAL
COMMUNITY
Start: 2024-09-20

## 2024-11-07 RX ORDER — FLUTICASONE PROPIONATE 50 MCG
2 SPRAY, SUSPENSION (ML) NASAL DAILY
COMMUNITY
Start: 2024-08-08

## 2024-11-07 RX ORDER — METHOCARBAMOL 500 MG/1
TABLET, FILM COATED ORAL
COMMUNITY
Start: 2024-09-19

## 2024-11-07 NOTE — TELEPHONE ENCOUNTER
----- Message from Gema Pope PA-C sent at 11/7/2024  8:52 AM EST -----  Colonoscopy recall in August 2025 for history of precancerous colon polyp, thank you

## 2024-11-07 NOTE — PROGRESS NOTES
[facility]  Alexx Montgomery 34 y.o. male MRN: [id number]       CC: Follow-up    HPI: Alexx is a 34-year-old male with history of IBS with diarrhea, anxiety, hyperlipidemia, hypertension, and follows with [facility] cardiology for history of bicuspid aortic valve/nonrheumatic aortic valve insufficiency.  Patient presents to the office today for follow-up.     During our last follow-up, the patient was complaining of chest discomfort.  Patient reports he has a hard time differentiating this between his cardiac history and GI history.  I sent him for a barium swallow revealing small hiatal hernia and a few episodes of mild to moderate reflux.  Esophageal motility was normal.    Fortunately, since her last follow-up he is complaining of a burning sensation in his upper esophagus.  Otherwise, denies any further episodes of chest pain.  Continues to eat a very healthy diet, feels that his weight loss has plateaued.  However, still remaining active.  From an IBS standpoint, has not had an episode of fecal incontinence in several months.  He no longer relies on Imodium.  Trying to decrease his medication list.  Reports that he feels a lot of his symptoms are related to his anxiety.    Patient underwent EGD and colonoscopy through [facility] in August 2022.  Although the reports are not available to me, fortunately the pathology is.  Patient had evidence of duodenitis and gastritis.  He had a random colon biopsy that was negative for microscopic colitis and a single polyp removed, which was a tubular adenoma.  In addition he had fecal calprotectin testing that was normal.      Review of Systems:    CONSTITUTIONAL: Denies any fever, chills, or rigors. Good appetite, and no recent weight loss.  HEENT: No earache or tinnitus. Denies hearing loss or visual disturbances.  CARDIOVASCULAR: No chest pain or palpitations.   RESPIRATORY: Denies any cough, hemoptysis, shortness of breath or dyspnea on  exertion.  GASTROINTESTINAL: As noted in the History of Present Illness.   GENITOURINARY: No problems with urination. Denies any hematuria or dysuria.  NEUROLOGIC: No dizziness or vertigo, denies headaches.   MUSCULOSKELETAL: Denies any muscle or joint pain.   SKIN: Denies skin rashes or itching.   ENDOCRINE: Denies excessive thirst. Denies intolerance to heat or cold.  PSYCHOSOCIAL: Denies depression or anxiety. Denies any recent memory loss.       Current Outpatient Medications   Medication Sig Dispense Refill    cetirizine (ZyrTEC) 10 MG chewable tablet Chew 10 mg daily as needed      fluticasone (FLONASE) 50 mcg/act nasal spray 2 sprays daily      pantoprazole (PROTONIX) 40 mg tablet Take 1 tablet (40 mg total) by mouth 2 (two) times a day 60 tablet 5    Sunosi 75 MG tablet Take 75 mg by mouth daily      venlafaxine (EFFEXOR-XR) 37.5 mg 24 hr capsule Take 37.5 mg by mouth daily      azithromycin (ZITHROMAX) 250 mg tablet take 2 tablets by mouth on day 1 then take 1 tablet on days 2 through 5 (Patient not taking: Reported on 11/7/2024)      benzonatate (TESSALON) 200 MG capsule Take 200 mg by mouth 3 (three) times a day as needed for cough (Patient not taking: Reported on 11/7/2024)      busPIRone (BUSPAR) 5 mg tablet Take 5 mg by mouth Three times a day (Patient not taking: Reported on 8/8/2024)      cefuroxime (CEFTIN) 500 mg tablet Take 500 mg by mouth 2 (two) times a day (Patient not taking: Reported on 8/8/2024)      ciprofloxacin-dexamethasone (CIPRODEX) otic suspension instill 4 drops into left ear twice a day for 7 days (Patient not taking: Reported on 11/7/2024)      desloratadine (CLARINEX) 5 MG tablet Take 5 mg by mouth daily (Patient not taking: Reported on 11/7/2024)      dexamethasone (DECADRON) 2 mg tablet Take 1 tablet (2 mg total) by mouth daily with breakfast (Patient not taking: Reported on 8/8/2024) 5 tablet 0    diphenoxylate-atropine (LOMOTIL) 2.5-0.025 mg per tablet Take 1 tablet by mouth 2  (two) times a day as needed for diarrhea (Patient not taking: Reported on 8/8/2024) 60 tablet 0    ergocalciferol (VITAMIN D2) 50,000 units Take 1 capsule (50,000 Units total) by mouth once a week 10 capsule 0    hyoscyamine (LEVSIN/SL) 0.125 mg SL tablet take 1 tablet by mouth every 4 hours if needed for cramping take ...  (REFER TO PRESCRIPTION NOTES). (Patient not taking: Reported on 11/7/2024)      LORazepam (Ativan) 0.5 mg tablet Take one tab half hour prior to MRI and another tab if still anxious immediately prior to going into MRI 2 tablet 0    magnesium oxide (MAG-OX) 400 mg tablet Take 1 tablet (400 mg total) by mouth 2 (two) times a day (Patient not taking: Reported on 8/8/2024) 30 tablet 0    methocarbamol (ROBAXIN) 500 mg tablet take 1 tablet by mouth three times a day if needed for muscle spasm (TMJ) (Patient not taking: Reported on 11/7/2024)      methylPREDNISolone 4 MG tablet therapy pack use as directed FOLLOW DIRECTIONS ON BACK OF FOIL PACK (Patient not taking: Reported on 11/7/2024)      metoprolol succinate (TOPROL-XL) 25 mg 24 hr tablet Take 25 mg by mouth daily (Patient not taking: Reported on 8/8/2024)      modafinil (PROVIGIL) 100 mg tablet Take 100 mg by mouth 2 (two) times a day      nortriptyline (PAMELOR) 10 mg capsule Take 1 capsule (10 mg total) by mouth daily at bedtime (Patient not taking: Reported on 5/16/2024) 90 capsule 0    potassium chloride (Klor-Con M20) 20 mEq tablet Take 1 tablet (20 mEq total) by mouth 2 (two) times a day (Patient not taking: Reported on 8/8/2024) 30 tablet 0    rizatriptan (MAXALT-MLT) 10 mg disintegrating tablet  (Patient not taking: Reported on 2/8/2024)      sildenafil (VIAGRA) 50 MG tablet Take 50 mg by mouth       No current facility-administered medications for this visit.     History reviewed. No pertinent past medical history.  History reviewed. No pertinent surgical history.  Social History     Socioeconomic History    Marital status: /Civil  Union     Spouse name: None    Number of children: None    Years of education: None    Highest education level: None   Occupational History    None   Tobacco Use    Smoking status: Former     Types: Cigars    Smokeless tobacco: Never    Tobacco comments:     cigars currently on occasion.   Vaping Use    Vaping status: Never Used   Substance and Sexual Activity    Alcohol use: Not Currently    Drug use: Never    Sexual activity: Yes     Partners: Female   Other Topics Concern    None   Social History Narrative    None     Social Determinants of Health     Financial Resource Strain: Medium Risk (9/14/2024)    Received from [facility]    Overall Financial Resource Strain (CARDIA)     Difficulty of Paying Living Expenses: Somewhat hard   Food Insecurity: No Food Insecurity (9/14/2024)    Received from [facility]    Hunger Vital Sign     Worried About Running Out of Food in the Last Year: Never true     Ran Out of Food in the Last Year: Never true   Recent Concern: Food Insecurity - Food Insecurity Present (6/17/2024)    Received from [facility]    Hunger Vital Sign     Worried About Running Out of Food in the Last Year: Sometimes true     Ran Out of Food in the Last Year: Never true   Transportation Needs: No Transportation Needs (9/14/2024)    Received from [facility]    PRAPARE - Transportation     Lack of Transportation (Medical): No     Lack of Transportation (Non-Medical): No   Physical Activity: Insufficiently Active (4/25/2023)    Received from [facility]    Exercise Vital Sign     Days of Exercise per Week: 3 days     Minutes of Exercise per Session: 30 min   Stress: Stress Concern Present (6/17/2024)    Received from [facility]    [employer] - Occupational Stress Questionnaire     Feeling of Stress : Rather much   Social Connections: Feeling Socially Integrated (6/17/2024)     Received from [facility]    OASIS : Social Isolation     How often do you feel lonely or isolated from those around you?: Rarely   Intimate Partner Violence: Not At Risk (9/14/2024)    Received from [facility]    Humiliation, Afraid, Rape, and Kick questionnaire     Fear of Current or Ex-Partner: No     Emotionally Abused: No     Physically Abused: No     Sexually Abused: No   Housing Stability: Low Risk  (9/14/2024)    Received from [facility]    Housing Stability Vital Sign     Unable to Pay for Housing in the Last Year: No     Number of Times Moved in the Last Year: 0     Homeless in the Last Year: No     Family History   Problem Relation Age of Onset    No Known Problems Mother     Prostate cancer Father     Cancer Father         Prostate cancer            PHYSICAL EXAM:    There were no vitals filed for this visit.  General Appearance:   Alert and oriented x 3. Cooperative, and in no respiratory distress   HEENT:   Normocephalic, atraumatic, anicteric.     Neck:  Supple, symmetrical, trachea midline   Lungs:   Clear to auscultation bilaterally    Heart::   Regular rate and rhythm   Abdomen:   Soft, non-tender, non-distended; normal bowel sounds; no masses, no organomegaly    Genitalia:   Deferred    Rectal:   Deferred    Extremities:  No cyanosis, clubbing or edema    Pulses:  2+ and symmetric all extremities    Skin:  Skin color, texture, turgor normal, no rashes or lesions    Lymph nodes:  No palpable cervical or supraclavicular lymphadenopathy        Lab Results:       Results from last 6 Months   Lab Units 09/13/24  1107   POTASSIUM mmol/L 3.9   CHLORIDE mmol/L 103   CO2 mmol/L 28   BUN mg/dL 21   CREATININE mg/dL 0.99   CALCIUM mg/dL 10.0   ALK PHOS U/L 82   ALT U/L 17   AST U/L 16               Imaging Studies:   FL barium swallow    Result Date: 8/21/2024  Impression: No obstructing or constricting lesion Small sliding hiatal hernia seen Few  "episodes of mild to moderate gastroesophageal reflux Workstation performed: [id number]       ASSESSMENT and PLAN:      1) Hiatal hernia, GERD - Patient will increase his Protonix to twice daily for 8 weeks, then go back to once daily dosing.  Feels that Protonix works better than omeprazole.  Overall, feeling better.  We discussed the anatomy of hiatal hernia.  No need for surgical evaluation at this time given small size and the fact that the patient is doing well with medication and lifestyle changes.    2) History of colon polyps - Colonoscopy recall in August 2025 for history of precancerous colon polyp      Follow up in 6 months.  Sooner as needed.      Portions of the record may have been created with voice recognition software.  Occasional wrong word or \"sound a like\" substitutions may have occurred due to the inherent limitations of voice recognition software.  Read the chart carefully and recognize, using context, where substitutions have occurred.  "

## 2024-12-19 DIAGNOSIS — K21.9 GASTROESOPHAGEAL REFLUX DISEASE, UNSPECIFIED WHETHER ESOPHAGITIS PRESENT: ICD-10-CM

## 2024-12-19 RX ORDER — PANTOPRAZOLE SODIUM 40 MG/1
40 TABLET, DELAYED RELEASE ORAL 2 TIMES DAILY
Qty: 180 TABLET | Refills: 1 | Status: SHIPPED | OUTPATIENT
Start: 2024-12-19

## 2025-02-12 ENCOUNTER — OFFICE VISIT (OUTPATIENT)
Dept: CARDIOLOGY CLINIC | Facility: CLINIC | Age: 35
End: 2025-02-12
Payer: COMMERCIAL

## 2025-02-12 VITALS
SYSTOLIC BLOOD PRESSURE: 140 MMHG | DIASTOLIC BLOOD PRESSURE: 80 MMHG | HEIGHT: 71 IN | WEIGHT: 225 LBS | HEART RATE: 82 BPM | BODY MASS INDEX: 31.5 KG/M2

## 2025-02-12 DIAGNOSIS — Q23.81 BICUSPID AORTIC VALVE WITH ASCENDING AORTA 4.0 TO 4.5 CM IN DIAMETER: Primary | ICD-10-CM

## 2025-02-12 PROCEDURE — 99204 OFFICE O/P NEW MOD 45 MIN: CPT | Performed by: INTERNAL MEDICINE

## 2025-02-12 RX ORDER — MIRABEGRON 50 MG/1
50 TABLET, FILM COATED, EXTENDED RELEASE ORAL DAILY
COMMUNITY
Start: 2025-01-27 | End: 2026-01-27

## 2025-02-12 NOTE — PATIENT INSTRUCTIONS
"Patient Education     Aortic stenosis   The Basics   Written by the doctors and editors at [facility]   What is aortic stenosis? -- Aortic stenosis is a condition in which the aortic valve doesn't open fully (figure 1). The aortic valve is 1 of the 4 heart valves. The heart valves keep blood flowing in only 1 direction. When the heart valves work normally, they open all of the way to let blood flow through them.  Blood flows from a chamber of the heart called the left ventricle, through the aortic valve, into a large blood vessel called the aorta (figure 1). The aorta carries blood to the rest of the body.  In aortic stenosis, the aortic valve gets stuck and does not open fully. This makes the valve opening narrow. When this happens:   Not as much blood can flow out of the heart to the rest of the body.   The heart has to work much harder than usual to pump blood to the rest of the body. Over time, this can cause heart problems.  Aortic stenosis usually happens in adults. But some people are born with it.  What are the symptoms of aortic stenosis? -- Early on, most people have no symptoms. They usually find out that they have aortic stenosis after their doctor or nurse hears a heart murmur on a routine exam. A heart murmur is an extra sound in the heartbeat that doctors or nurses hear when they listen to the heart with a stethoscope.  When people do have symptoms, they can have:   Shortness of breath   Dizziness or fainting   Chest pain  These symptoms usually happen with physical activity. Tell your doctor if you have any of these symptoms.  Is there a test for aortic stenosis? -- Yes. To check for aortic stenosis and see how severe it is, your doctor might order an echocardiogram (or \"echo\"). This test uses sound waves to create a picture of your heart as it beats. It shows the size of your heart chambers, how well your heart is pumping, and how well your heart valves are working (figure 2). If you have aortic " "stenosis, your doctor might repeat this test over time to see if your condition changes.  Your doctor might order a test called an electrocardiogram (\"ECG\"). This test measures the electrical activity in your heart (figure 3).  Some people with aortic stenosis will also have a chest X-ray. A chest X-ray can show the size and shape of your heart. It can also show changes from water in your lungs caused by aortic stenosis or other diseases.  To get more information about your heart, your doctor might order a test called cardiac catheterization (or \"cardiac cath\"). For this, the doctor puts a thin tube into a blood vessel in your leg or arm. Then, they move the tube up to your heart. When the tube is in your heart or blood vessels, the doctor will take measurements. They might also put a dye that shows up on an X-ray into the tube. This can show if any of the arteries in your heart are narrowed or blocked. This part of the test is called \"coronary angiography.\"  How is aortic stenosis treated? -- It depends on your symptoms and how severe your aortic stenosis is. If your aortic stenosis is not severe and you have no symptoms, your doctor will see you regularly. This way, they will know if your aortic stenosis gets worse or if you start to have symptoms.  If your aortic stenosis is severe and you have symptoms, you will likely need treatment. Treatment can include:   A procedure to put in a new valve - This can be done with surgery or without surgery:   During surgery, the doctor will remove your narrowed valve and replace it with a valve that opens normally. This new valve can be made from metal or from tissue from an animal or another person. Your doctor will talk with you about the benefits and downsides of each option.   A procedure to put in a new aortic valve without surgery is called \"transcatheter aortic valve implantation\" (\"TORY\") or \"transcatheter aortic valve replacement\" (\"TAVR\").   A procedure to open the " "aortic valve - A doctor inflates a balloon in the narrowed aortic valve to try to open it. This procedure is used in children and young adults, because it is helpful in these people. This procedure is usually less helpful in older adults.   Medicines - There are no medicines to treat the aortic valve stenosis itself. Your doctor might prescribe medicines to treat your symptoms. They will also make sure that your blood pressure and cholesterol levels are under control.  Can I play sports? -- You and your doctor should discuss the level of physical activity that is right for you. If your aortic stenosis is mild, you can probably play sports. But if your aortic stenosis is more serious or you have symptoms, your doctor might recommend that you limit your physical activity.  What if I want to get pregnant? -- If you want to get pregnant, talk with your doctor or nurse. Depending on your aortic stenosis and symptoms, they might recommend treating your aortic stenosis before you get pregnant.  All topics are updated as new evidence becomes available and our peer review process is complete.  This topic retrieved from [employer] on: Apr 06, 2024.  Topic 14589 Version 12.0  Release: 32.3.2 - C32.95  © 2024 UpToDate, Inc. and/or its affiliates. All rights reserved.  figure 1: Aortic stenosis     When people have aortic stenosis, the aortic valve does not open fully. This prevents blood from flowing normally from the left ventricle, through the aortic valve, and to the aorta. The direction of blood flow is shown by the black arrow. The aorta is a big blood vessel that carries blood to the rest of the body.  Graphic 85525 Version 2.0  figure 2: Transthoracic echocardiogram (echo)     This picture shows a person getting an echocardiogram (or \"echo\"). To do an echo, a doctor or nurse puts some gel on a person's chest. They press a thick wand (called a \"transducer\") against the chest and move it around. An echo uses sound waves to " "create images of the heart that appear on a computer screen. A test called an electrocardiogram (ECG) is done during an echo. For an ECG, patches (called \"electrodes\") are stuck to a person's chest. Wires run from the patches to a machine that records the electrical activity of the heart.  Graphic 31140 Version 5.0  figure 3: Person having an ECG     This drawing shows a person having an ECG (also called an electrocardiogram or EKG). There are patches, called \"electrodes,\" stuck onto the chest, arms, and legs. Wires run from the electrodes to the ECG machine. An ECG measures the electrical activity in the heart.  Graphic 67836 Version 3.0  Consumer Information Use and Disclaimer   Disclaimer: This generalized information is a limited summary of diagnosis, treatment, and/or medication information. It is not meant to be comprehensive and should be used as a tool to help the user understand and/or assess potential diagnostic and treatment options. It does NOT include all information about conditions, treatments, medications, side effects, or risks that may apply to a specific patient. It is not intended to be medical advice or a substitute for the medical advice, diagnosis, or treatment of a health care provider based on the health care provider's examination and assessment of a patient's specific and unique circumstances. Patients must speak with a health care provider for complete information about their health, medical questions, and treatment options, including any risks or benefits regarding use of medications. This information does not endorse any treatments or medications as safe, effective, or approved for treating a specific patient. UpToDate, Inc. and its affiliates disclaim any warranty or liability relating to this information or the use thereof.The use of this information is governed by the Terms of Use, available at https://www.[employer].com/en/know/clinical-effectiveness-terms. 2024© UpToDate, Inc. " and its affiliates and/or licensors. All rights reserved.  Copyright   © 2024 [employer] and/or its affiliates. All rights reserved.

## 2025-02-12 NOTE — PROGRESS NOTES
Patient ID: Alexx Montgomery is a 34 y.o. male.        Plan:      Bicuspid aortic valve with ascending aorta 4.0 to 4.5 cm in diameter  No indication for surgery at this time.  Intolerant to BB Rx.  Discussed lack of medical therapy available to prevent progressive AS/AI.  Discussed how this progression is usually quite slow(over years).  Discussed likely symptoms of severe AS/AI in layman's terms (ie exertional CP, LOZADA, (pre)syncope), to report such should they arise, as well as when we generally advise surgical intervention with respect to the aforementioned as well as the dimension of his Ascending aorta.  Discussed how he should limit his lift/pull/pushing to 50 lbs and not lift/pull/push anything that requires he valsalva/hold his breath to do so.  I have not advised any aerobic restrictions.  Discussed likely benefit that he continues to follow SBE Abx prophylaxis.  Suggested annual Echocardiography and Cardiac appt follow ups.  Assured him his current Cardiologist is doing a wonderful job here and encouraged he follow up with him as I am not in the habit of stealing patients.  I told him I'd be glad to see him as needed.  I ordered no testing and prescribed no meds at this time.       Follow up Plan/Other summary comments:  See above.  There is no evidence for angina, CHF, malignant arrhythmia.  Advised no medication changes today.    Return if symptoms worsen or fail to improve.    HPI: Alexx is a very pleasant 33 yo wm here for another opinion regarding his Bicuspid AV.  He was followed by Dr Dias for some time, now sees Dr Ren, we reviewed his recent and some prior Echos together as well as I reviewed and will not reiterate his recent cardiac visit note from Dec 24.  Alexx hasn't had any new symptoms since.  Alexx just wanted to make sure he is doing all he should be.  He isnt having any alarming dyspnea, works regularly, has what sounds like anxiety driven(self admitted) or musculoskeletal, reproducible with  his own touching of, chest pains but none triggered by aerobic activity of late.  No palps nor ([id number], no TIA or alec sx, no edema orthopnea nor pND.  Tried Metoprolol in the past, feels much better off of it.  Asked about weight restrictions and activity restrictions.  He has intentionally lost over 60 lbs on his own, wants to lose another 25+ more.  Trying to be more proactive about his health.  Is aware should have his children/siblings checked for Bicuspid AV.  He is aware he may of suffered SBE silently suggested per prior echo reports, does follow SBE Abx prophylaxis.      Normal sinus rhythm  Incomplete right bundle branch block  Minimal voltage criteria for LVH, may be normal variant  Borderline ECG  When compared with ECG of 02-MAY-2024 21:14,  No significant change was found  Confirmed by Janak Zarco ([id number]) on 5/10/2024      Most recent or relevant cardiac/vascular testin/3/24 Echo    Left Ventricle: Left ventricle is normal in size. There is moderate   concentric hypertrophy. Systolic function is normal with an ejection   fraction of 61-65%. There is normal diastolic function.     Right Ventricle: Right ventricle cavity is normal. Systolic function is   normal.    Aortic Valve: The aortic valve is congenitally bicuspid. The leaflets   are moderately thickened. The leaflets are moderately calcified. There is   moderate regurgitation. There is moderate stenosis. Peak aortic velocity   2.8 m/sec, mean gradient 20mmhg, DOLORES 1.2 cm2.     Mitral Valve: The leaflets are mildly thickened. There is systolic   anterior motion of the chordal apparatus.     Ascending Aorta: The aortic root is dilated(3.6 cm). The ascending   aorta is moderately dilated (4.2 cm).     Tricuspid Valve: There is trace regurgitation.     Pulmonic Valve: PA pressure not calculated due to incomplete TR signal.     23 MATT    Left Ventricle: Systolic function is normal with an ejection fraction   of 60-65%.     The  "aortic valve is congenitally bicuspid, left and right cusps are   fused. The leaflets are mildly calcified. There is moderate regurgitation   with eccentrically directed jet. There is mild stenosis, DOLORES 1.8 cm2.     The ascending aorta is mildly dilated 4 cm     Mitral Valve: There is mild regurgitation.     9/24 CESIA    Study Impression: Negative exercise ECG for ischemia and negative   stress echo for ischemia.     History reviewed. No pertinent surgical history.    Lipid Profile: Reviewed      Review of Systems   10  point ROS  was otherwise non pertinent or negative except as per HPI or as below.         Objective:     /80   Pulse 82   Ht 5' 11\" (1.803 m)   Wt 102 kg (225 lb)   BMI 31.38 kg/m²     PHYSICAL EXAM:    General:  Normal appearance in no distress.  Eyes:  Anicteric.  Oral mucosa:  Moist.  Neck:  No JVD. Carotid upstrokes are brisk without bruits.  No masses.  Chest:  Clear to auscultation.  Cardiac:  No palpable PMI.  Normal S1 and S2.  2/6 SE murmur and 1/6 short diastolic M at base and upper RSB respectively, no gallop or rub.  Abdomen:  Soft and nontender. No palpable organomegaly or aortic enlargement.  Extremities:  No peripheral edema.  Musculoskeletal:  Symmetric.   Vascular:  Pedal pulses are intact.  Neuro:  Grossly symmetric.  Psych:  Alert and oriented x3.      Meds reviewed.    Current Outpatient Medications:     cetirizine (ZyrTEC) 10 MG chewable tablet, Chew 10 mg daily as needed, Disp: , Rfl:     fluticasone (FLONASE) 50 mcg/act nasal spray, 2 sprays daily, Disp: , Rfl:     Mirabegron ER 50 MG TB24, Take 50 mg by mouth daily, Disp: , Rfl:     pantoprazole (PROTONIX) 40 mg tablet, Take 1 tablet (40 mg total) by mouth 2 (two) times a day, Disp: 180 tablet, Rfl: 1    sildenafil (VIAGRA) 50 MG tablet, Take 50 mg by mouth, Disp: , Rfl:     Sunosi 75 MG tablet, Take 75 mg by mouth daily, Disp: , Rfl:     venlafaxine (EFFEXOR-XR) 37.5 mg 24 hr capsule, Take 37.5 mg by mouth daily, " Disp: , Rfl:     azithromycin (ZITHROMAX) 250 mg tablet, take 2 tablets by mouth on day 1 then take 1 tablet on days 2 through 5 (Patient not taking: Reported on 2/12/2025), Disp: , Rfl:     benzonatate (TESSALON) 200 MG capsule, Take 200 mg by mouth 3 (three) times a day as needed for cough (Patient not taking: Reported on 2/12/2025), Disp: , Rfl:     ciprofloxacin-dexamethasone (CIPRODEX) otic suspension, instill 4 drops into left ear twice a day for 7 days (Patient not taking: Reported on 2/12/2025), Disp: , Rfl:     desloratadine (CLARINEX) 5 MG tablet, Take 5 mg by mouth daily (Patient not taking: Reported on 2/12/2025), Disp: , Rfl:     hyoscyamine (LEVSIN/SL) 0.125 mg SL tablet, take 1 tablet by mouth every 4 hours if needed for cramping take ...  (REFER TO PRESCRIPTION NOTES). (Patient not taking: Reported on 2/12/2025), Disp: , Rfl:     methocarbamol (ROBAXIN) 500 mg tablet, take 1 tablet by mouth three times a day if needed for muscle spasm (TMJ) (Patient not taking: Reported on 2/12/2025), Disp: , Rfl:     methylPREDNISolone 4 MG tablet therapy pack, use as directed FOLLOW DIRECTIONS ON BACK OF FOIL PACK (Patient not taking: Reported on 2/12/2025), Disp: , Rfl:      History reviewed. No pertinent past medical history.        Social History     Tobacco Use   Smoking Status Former    Types: Cigars   Smokeless Tobacco Never   Tobacco Comments    cigars currently on occasion.

## 2025-02-12 NOTE — ASSESSMENT & PLAN NOTE
No indication for surgery at this time.  Intolerant to BB Rx.  Discussed lack of medical therapy available to prevent progressive AS/AI.  Discussed how this progression is usually quite slow(over years).  Discussed likely symptoms of severe AS/AI in layman's terms (ie exertional CP, LOZADA, (pre)syncope), to report such should they arise, as well as when we generally advise surgical intervention with respect to the aforementioned as well as the dimension of his Ascending aorta.  Discussed how he should limit his lift/pull/pushing to 50 lbs and not lift/pull/push anything that requires he valsalva/hold his breath to do so.  I have not advised any aerobic restrictions.  Discussed likely benefit that he continues to follow SBE Abx prophylaxis.  Suggested annual Echocardiography and Cardiac appt follow ups.  Assured him his current Cardiologist is doing a wonderful job here and encouraged he follow up with him as I am not in the habit of stealing patients.  I told him I'd be glad to see him as needed.  I ordered no testing and prescribed no meds at this time.

## 2025-05-07 ENCOUNTER — OFFICE VISIT (OUTPATIENT)
Dept: GASTROENTEROLOGY | Facility: CLINIC | Age: 35
End: 2025-05-07
Payer: COMMERCIAL

## 2025-05-07 VITALS
HEIGHT: 71 IN | WEIGHT: 221 LBS | DIASTOLIC BLOOD PRESSURE: 69 MMHG | HEART RATE: 86 BPM | SYSTOLIC BLOOD PRESSURE: 134 MMHG | OXYGEN SATURATION: 100 % | TEMPERATURE: 97.3 F | BODY MASS INDEX: 30.94 KG/M2

## 2025-05-07 DIAGNOSIS — K58.0 IRRITABLE BOWEL SYNDROME WITH DIARRHEA: ICD-10-CM

## 2025-05-07 DIAGNOSIS — K21.9 HIATAL HERNIA WITH GERD: Primary | ICD-10-CM

## 2025-05-07 DIAGNOSIS — R09.A2 GLOBUS SENSATION: ICD-10-CM

## 2025-05-07 DIAGNOSIS — K44.9 HIATAL HERNIA WITH GERD: Primary | ICD-10-CM

## 2025-05-07 PROCEDURE — 99213 OFFICE O/P EST LOW 20 MIN: CPT | Performed by: PHYSICIAN ASSISTANT

## 2025-05-07 RX ORDER — CLOTRIMAZOLE AND BETAMETHASONE DIPROPIONATE 10; .64 MG/G; MG/G
CREAM TOPICAL AS NEEDED
COMMUNITY
Start: 2025-02-19

## 2025-05-07 RX ORDER — FAMOTIDINE 40 MG/1
40 TABLET, FILM COATED ORAL 2 TIMES DAILY PRN
Qty: 180 TABLET | Refills: 2 | Status: SHIPPED | OUTPATIENT
Start: 2025-05-07

## 2025-05-07 RX ORDER — SOLRIAMFETOL 150 MG/1
150 TABLET, FILM COATED ORAL
COMMUNITY
Start: 2025-05-06

## 2025-05-07 NOTE — PROGRESS NOTES
Name: Alexx Montgomery      : 1990      MRN: [id number]  Encounter Provider: Gema Pope PA-C  Encounter Date: 2025   Encounter department: [facility] [place]  :  Assessment & Plan  Hiatal hernia with GERD  Patient reports he has been taking Protonix daily, has not been taking the second dosage.  He would like to minimize his medication usage, and PPI usage would also potentially contribute to diarrhea.  Globus sensation    Orders:    US thyroid; Future  Check thyroid ultrasound.  Irritable bowel syndrome with diarrhea  Patient reports that he had a worsening of diarrheal symptoms for 9 days, and he followed up with his PCP who ordered a C. difficile test which was negative and CT scan that he is going for today.  May be secondary to viral gastroenteritis.  He had a full workup for IBD in [id number].  Patient reports that he feels nervous when he goes out in public, but fortunately has not had an incontinence episode in 2 years.  Advised the patient to take two 2 mg Imodium and Gas-X prior to going out.  Follow-up in 6 months, sooner as needed.    History of Present Illness   HPI  Alexx Montgomery is a 35 y.o. male with history of IBS with diarrhea, anxiety, hyperlipidemia, hypertension, and follows with [facility] cardiology for history of bicuspid aortic valve/nonrheumatic aortic valve insufficiency.     Patient presents to the office for follow-up.  Reports that recently had a worsening of diarrheal symptoms for 9 days, and had a follow-up with his PCP.  His father had similar symptoms, but only lasted for 24 hours.  Patient reports that after he saw his PCP, his symptoms have been subsiding and he has been able to eat again.  Reports there is a CT scan scheduled at [facility] today.  Otherwise, denies unintentional weight loss or signs of GI bleeding.  In addition, is complaining of globus sensation, but no significant heartburn fortunately.    Patient  "underwent EGD and colonoscopy through [facility] in August 2022.  Although the reports are not available to me, fortunately the pathology is.  Patient had evidence of duodenitis and gastritis.  He had a random colon biopsy that was negative for microscopic colitis and a single polyp removed, which was a tubular adenoma.  In addition he had fecal calprotectin testing that was normal.  Video capsule endoscopy in 2023 performed by us was negative for Crohn's disease.        Review of Systems   Constitutional:  Positive for activity change and appetite change. Negative for chills, diaphoresis, fatigue and unexpected weight change.   HENT:  Negative for sore throat and trouble swallowing.    Eyes:  Negative for discharge and redness.   Respiratory:  Negative for cough, shortness of breath and wheezing.    Cardiovascular:  Negative for chest pain and palpitations.   Gastrointestinal:  Positive for diarrhea. Negative for anal bleeding, blood in stool, constipation, nausea, rectal pain and vomiting.   Endocrine: Negative for cold intolerance and heat intolerance.   Musculoskeletal:  Negative for joint swelling and myalgias.   Skin:  Negative for pallor and rash.   Neurological:  Negative for dizziness, tremors, weakness, light-headedness, numbness and headaches.   Hematological:  Negative for adenopathy. Does not bruise/bleed easily.   Psychiatric/Behavioral:  Negative for behavioral problems, confusion, dysphoric mood and sleep disturbance. The patient is not nervous/anxious.           Objective   /69 (BP Location: Right arm, Patient Position: Sitting, Cuff Size: Standard)   Pulse 86   Temp (!) 97.3 °F (36.3 °C) (Tympanic)   Ht 5' 11\" (1.803 m)   Wt 100 kg (221 lb)   SpO2 100%   BMI 30.82 kg/m²        "

## 2025-05-15 ENCOUNTER — HOSPITAL ENCOUNTER (OUTPATIENT)
Dept: RADIOLOGY | Facility: MEDICAL CENTER | Age: 35
Discharge: HOME | End: 2025-05-15
Payer: COMMERCIAL

## 2025-05-15 DIAGNOSIS — R09.A2 GLOBUS SENSATION: ICD-10-CM

## 2025-05-15 PROCEDURE — 76536 US EXAM OF HEAD AND NECK: CPT

## 2025-05-21 ENCOUNTER — RESULTS FOLLOW-UP (OUTPATIENT)
Dept: GASTROENTEROLOGY | Facility: CLINIC | Age: 35
End: 2025-05-21